# Patient Record
Sex: MALE | Race: ASIAN | NOT HISPANIC OR LATINO | URBAN - METROPOLITAN AREA
[De-identification: names, ages, dates, MRNs, and addresses within clinical notes are randomized per-mention and may not be internally consistent; named-entity substitution may affect disease eponyms.]

---

## 2017-12-15 ENCOUNTER — OUTPATIENT (OUTPATIENT)
Dept: EMERGENCY DEPT | Facility: HOSPITAL | Age: 67
LOS: 1 days | Discharge: HOME | End: 2017-12-15

## 2017-12-15 ENCOUNTER — INPATIENT (INPATIENT)
Facility: HOSPITAL | Age: 67
LOS: 5 days | Discharge: HOME | DRG: 236 | End: 2017-12-21
Attending: THORACIC SURGERY (CARDIOTHORACIC VASCULAR SURGERY) | Admitting: THORACIC SURGERY (CARDIOTHORACIC VASCULAR SURGERY)
Payer: COMMERCIAL

## 2017-12-15 VITALS
SYSTOLIC BLOOD PRESSURE: 135 MMHG | OXYGEN SATURATION: 100 % | HEART RATE: 80 BPM | TEMPERATURE: 98 F | DIASTOLIC BLOOD PRESSURE: 107 MMHG | RESPIRATION RATE: 18 BRPM

## 2017-12-15 VITALS
TEMPERATURE: 97 F | HEART RATE: 66 BPM | RESPIRATION RATE: 39 BRPM | OXYGEN SATURATION: 100 % | WEIGHT: 197.98 LBS | HEIGHT: 66 IN | DIASTOLIC BLOOD PRESSURE: 93 MMHG | SYSTOLIC BLOOD PRESSURE: 167 MMHG

## 2017-12-15 DIAGNOSIS — I25.10 ATHEROSCLEROTIC HEART DISEASE OF NATIVE CORONARY ARTERY WITHOUT ANGINA PECTORIS: ICD-10-CM

## 2017-12-15 DIAGNOSIS — R07.9 CHEST PAIN, UNSPECIFIED: ICD-10-CM

## 2017-12-15 DIAGNOSIS — Z98.890 OTHER SPECIFIED POSTPROCEDURAL STATES: Chronic | ICD-10-CM

## 2017-12-15 LAB
ALBUMIN SERPL ELPH-MCNC: 4.1 G/DL — SIGNIFICANT CHANGE UP (ref 3.3–5)
ALP SERPL-CCNC: 53 U/L — SIGNIFICANT CHANGE UP (ref 40–120)
ALT FLD-CCNC: 26 U/L — SIGNIFICANT CHANGE UP (ref 4–41)
APTT BLD: 29.1 SEC — SIGNIFICANT CHANGE UP (ref 27.5–37.4)
APTT BLD: 45.4 SEC — HIGH (ref 27.5–37.4)
AST SERPL-CCNC: 23 U/L — SIGNIFICANT CHANGE UP (ref 4–40)
BASE EXCESS BLDV CALC-SCNC: 1.1 MMOL/L — SIGNIFICANT CHANGE UP
BASOPHILS # BLD AUTO: 0.05 K/UL — SIGNIFICANT CHANGE UP (ref 0–0.2)
BASOPHILS NFR BLD AUTO: 0.5 % — SIGNIFICANT CHANGE UP (ref 0–2)
BILIRUB SERPL-MCNC: 0.4 MG/DL — SIGNIFICANT CHANGE UP (ref 0.2–1.2)
BLOOD GAS VENOUS - CREATININE: 0.69 MG/DL — SIGNIFICANT CHANGE UP (ref 0.5–1.3)
BUN SERPL-MCNC: 14 MG/DL — SIGNIFICANT CHANGE UP (ref 7–23)
CALCIUM SERPL-MCNC: 9.1 MG/DL — SIGNIFICANT CHANGE UP (ref 8.4–10.5)
CHLORIDE BLDV-SCNC: 103 MMOL/L — SIGNIFICANT CHANGE UP (ref 96–108)
CHLORIDE SERPL-SCNC: 98 MMOL/L — SIGNIFICANT CHANGE UP (ref 98–107)
CK MB BLD-MCNC: 3.17 NG/ML — SIGNIFICANT CHANGE UP (ref 1–6.6)
CK SERPL-CCNC: 161 U/L — SIGNIFICANT CHANGE UP (ref 30–200)
CO2 SERPL-SCNC: 23 MMOL/L — SIGNIFICANT CHANGE UP (ref 22–31)
CREAT SERPL-MCNC: 0.89 MG/DL — SIGNIFICANT CHANGE UP (ref 0.5–1.3)
EOSINOPHIL # BLD AUTO: 0.14 K/UL — SIGNIFICANT CHANGE UP (ref 0–0.5)
EOSINOPHIL NFR BLD AUTO: 1.4 % — SIGNIFICANT CHANGE UP (ref 0–6)
GAS PNL BLDA: SIGNIFICANT CHANGE UP
GAS PNL BLDV: 134 MMOL/L — LOW (ref 136–146)
GLUCOSE BLDV-MCNC: 178 — HIGH (ref 70–99)
GLUCOSE SERPL-MCNC: 167 MG/DL — HIGH (ref 70–99)
HCO3 BLDV-SCNC: 26 MMOL/L — SIGNIFICANT CHANGE UP (ref 20–27)
HCT VFR BLD CALC: 44.3 % — SIGNIFICANT CHANGE UP (ref 39–50)
HCT VFR BLDV CALC: 47.1 % — SIGNIFICANT CHANGE UP (ref 39–51)
HGB BLD-MCNC: 14.9 G/DL — SIGNIFICANT CHANGE UP (ref 13–17)
HGB BLDV-MCNC: 15.4 G/DL — SIGNIFICANT CHANGE UP (ref 13–17)
IMM GRANULOCYTES # BLD AUTO: 0.03 # — SIGNIFICANT CHANGE UP
IMM GRANULOCYTES NFR BLD AUTO: 0.3 % — SIGNIFICANT CHANGE UP (ref 0–1.5)
INR BLD: 1.01 — SIGNIFICANT CHANGE UP (ref 0.88–1.17)
INR BLD: 1.04 RATIO — SIGNIFICANT CHANGE UP (ref 0.88–1.16)
LACTATE BLDV-MCNC: 1.7 MMOL/L — SIGNIFICANT CHANGE UP (ref 0.5–2)
LYMPHOCYTES # BLD AUTO: 3.32 K/UL — HIGH (ref 1–3.3)
LYMPHOCYTES # BLD AUTO: 32 % — SIGNIFICANT CHANGE UP (ref 13–44)
MCHC RBC-ENTMCNC: 28.9 PG — SIGNIFICANT CHANGE UP (ref 27–34)
MCHC RBC-ENTMCNC: 33.6 % — SIGNIFICANT CHANGE UP (ref 32–36)
MCV RBC AUTO: 86 FL — SIGNIFICANT CHANGE UP (ref 80–100)
MONOCYTES # BLD AUTO: 0.53 K/UL — SIGNIFICANT CHANGE UP (ref 0–0.9)
MONOCYTES NFR BLD AUTO: 5.1 % — SIGNIFICANT CHANGE UP (ref 2–14)
NEUTROPHILS # BLD AUTO: 6.3 K/UL — SIGNIFICANT CHANGE UP (ref 1.8–7.4)
NEUTROPHILS NFR BLD AUTO: 60.7 % — SIGNIFICANT CHANGE UP (ref 43–77)
NRBC # FLD: 0 — SIGNIFICANT CHANGE UP
PA ADP PRP-ACNC: 197 PRU — SIGNIFICANT CHANGE UP (ref 194–417)
PCO2 BLDV: 37 MMHG — LOW (ref 41–51)
PH BLDV: 7.44 PH — HIGH (ref 7.32–7.43)
PLATELET # BLD AUTO: 292 K/UL — SIGNIFICANT CHANGE UP (ref 150–400)
PMV BLD: 9.8 FL — SIGNIFICANT CHANGE UP (ref 7–13)
PO2 BLDV: 81 MMHG — HIGH (ref 35–40)
POTASSIUM BLDV-SCNC: 4 MMOL/L — SIGNIFICANT CHANGE UP (ref 3.4–4.5)
POTASSIUM SERPL-MCNC: 4.3 MMOL/L — SIGNIFICANT CHANGE UP (ref 3.5–5.3)
POTASSIUM SERPL-SCNC: 4.3 MMOL/L — SIGNIFICANT CHANGE UP (ref 3.5–5.3)
PROT SERPL-MCNC: 7.3 G/DL — SIGNIFICANT CHANGE UP (ref 6–8.3)
PROTHROM AB SERPL-ACNC: 11.2 SEC — SIGNIFICANT CHANGE UP (ref 9.8–12.7)
PROTHROM AB SERPL-ACNC: 11.2 SEC — SIGNIFICANT CHANGE UP (ref 9.8–13.1)
RBC # BLD: 5.15 M/UL — SIGNIFICANT CHANGE UP (ref 4.2–5.8)
RBC # FLD: 13.2 % — SIGNIFICANT CHANGE UP (ref 10.3–14.5)
SAO2 % BLDV: 94.9 % — HIGH (ref 60–85)
SODIUM SERPL-SCNC: 138 MMOL/L — SIGNIFICANT CHANGE UP (ref 135–145)
TROPONIN T SERPL-MCNC: 0.06 NG/ML — SIGNIFICANT CHANGE UP (ref 0–0.06)
WBC # BLD: 10.37 K/UL — SIGNIFICANT CHANGE UP (ref 3.8–10.5)
WBC # FLD AUTO: 10.37 K/UL — SIGNIFICANT CHANGE UP (ref 3.8–10.5)

## 2017-12-15 PROCEDURE — 93010 ELECTROCARDIOGRAM REPORT: CPT

## 2017-12-15 PROCEDURE — 99222 1ST HOSP IP/OBS MODERATE 55: CPT

## 2017-12-15 RX ORDER — METOPROLOL TARTRATE 50 MG
5 TABLET ORAL ONCE
Qty: 0 | Refills: 0 | Status: COMPLETED | OUTPATIENT
Start: 2017-12-15 | End: 2017-12-15

## 2017-12-15 RX ORDER — PANTOPRAZOLE SODIUM 20 MG/1
40 TABLET, DELAYED RELEASE ORAL
Qty: 0 | Refills: 0 | Status: DISCONTINUED | OUTPATIENT
Start: 2017-12-15 | End: 2017-12-16

## 2017-12-15 RX ORDER — CHLORHEXIDINE GLUCONATE 213 G/1000ML
30 SOLUTION TOPICAL ONCE
Qty: 0 | Refills: 0 | Status: COMPLETED | OUTPATIENT
Start: 2017-12-16 | End: 2017-12-16

## 2017-12-15 RX ORDER — FENTANYL CITRATE 50 UG/ML
50 INJECTION INTRAVENOUS ONCE
Qty: 0 | Refills: 0 | Status: DISCONTINUED | OUTPATIENT
Start: 2017-12-15 | End: 2017-12-15

## 2017-12-15 RX ORDER — CHLORHEXIDINE GLUCONATE 213 G/1000ML
1 SOLUTION TOPICAL ONCE
Qty: 0 | Refills: 0 | Status: COMPLETED | OUTPATIENT
Start: 2017-12-16 | End: 2017-12-16

## 2017-12-15 RX ORDER — SODIUM CHLORIDE 9 MG/ML
3 INJECTION INTRAMUSCULAR; INTRAVENOUS; SUBCUTANEOUS EVERY 8 HOURS
Qty: 0 | Refills: 0 | Status: DISCONTINUED | OUTPATIENT
Start: 2017-12-15 | End: 2017-12-15

## 2017-12-15 RX ORDER — HEPARIN SODIUM 5000 [USP'U]/ML
1000 INJECTION INTRAVENOUS; SUBCUTANEOUS
Qty: 25000 | Refills: 0 | Status: DISCONTINUED | OUTPATIENT
Start: 2017-12-15 | End: 2017-12-16

## 2017-12-15 RX ORDER — CEFUROXIME AXETIL 250 MG
1500 TABLET ORAL ONCE
Qty: 0 | Refills: 0 | Status: DISCONTINUED | OUTPATIENT
Start: 2017-12-15 | End: 2017-12-16

## 2017-12-15 RX ORDER — ASPIRIN/CALCIUM CARB/MAGNESIUM 324 MG
325 TABLET ORAL ONCE
Qty: 0 | Refills: 0 | Status: COMPLETED | OUTPATIENT
Start: 2017-12-15 | End: 2017-12-15

## 2017-12-15 RX ADMIN — Medication 325 MILLIGRAM(S): at 18:16

## 2017-12-15 RX ADMIN — FENTANYL CITRATE 50 MICROGRAM(S): 50 INJECTION INTRAVENOUS at 23:00

## 2017-12-15 NOTE — H&P CARDIOLOGY - RS GEN PE MLT RESP DETAILS PC
good air movement/respirations non-labored/no chest wall tenderness/breath sounds equal/clear to auscultation bilaterally/airway patent

## 2017-12-15 NOTE — ED ADULT TRIAGE NOTE - CHIEF COMPLAINT QUOTE
Pt. c/o midsternal chest pain and RICHTER x 1 week, seen at Cardiologist this AM who performed a Stress test and told patient to come to ER for further evaluation and treatment. Pt. c/o chest pain on exertion. Pt. c/o midsternal chest pain and RICHTER x 1 week, seen at Cardiologist this AM who performed a Stress test and told patient to come to ER for further evaluation and treatment. Pt. c/o chest pain on exertion. [id number]

## 2017-12-15 NOTE — H&P ADULT - HISTORY OF PRESENT ILLNESS
67 year old male with PMH of HTN, HLD, DM, presenting to [facility] with complaints of exertional CP X 3 weeks. Pt states that he has been experiencing midsternal, non radiating CP when walking quickly. He saw his PCP on 12/15/17 who referred him to cardiologist. His baseline EKG reveals NSR with 1AVB with bifasicular block.  He underwent nuclear stress test revealing severe anterolateral wall ischemia. Was seen at [facility] where angiogram via radial approach demonstrated left main disease. Pt was subsequently transferred to [facility] CTU on 12/15/17 for CABG scheduled on 12/16/17. He appears well and denies any CP, SOB, palpitations.

## 2017-12-15 NOTE — CONSULT NOTE ADULT - SUBJECTIVE AND OBJECTIVE BOX
HISTORY OF PRESENT ILLNESS: 67 year old male HTN, HLD, DM, known to our office with complaints of exertional dyspnea and exertional CP. His baseline EKG reveals NSR with 1AVB with bifascicular block.  He underwent nuclear stress test in the office today revealing  severe anterior, septal, apical, and distal inferior wall ischemia.   He denies any  syncope , near syncope or palpitations, nausea, or diaphoresis    PAST MEDICAL & SURGICAL HISTORY:  DM (diabetes mellitus)  HLD (hyperlipidemia)  HTN (hypertension)  No significant past surgical history      [x ] Diabetes   [x ] Hypertension  [x ] Hyperlipidemia  [ ] CAD  [ ] PCI  [ ] CABG    PREVIOUS DIAGNOSTIC TESTING:    [ ] Echocardiogram: 12/11/17 in office       EF 73%       nl LVS fx       mild DD       Nl RV fx       Mild Biatrial enlargement  [ ]  Catheterization:  [ ] Stress Test:  	12/15/17 in office        Severe anterior, septal, apical, and distal inferior wall ischemia.        Normal left ventricular size and function. Calculated EF is 54%.       MEDICATIONS:    aspirin 325 milliGRAM(s) Oral once    Allergies    No Known Allergies    Intolerances        FAMILY HISTORY:  No pertinent family history in first degree relatives      SOCIAL HISTORY:    [x ] Non-smoker  [ ] Former Smoker  [ ] Current Smoker  [ ] Alcohol      REVIEW OF SYSTEMS:  [x ]chest pain  [x  ]shortness of breath / RICHTER   [  ]palpitations  [  ]syncope  [ ]near syncope [  ]diplopia  [  ]altered mental status   [  ]fevers  [ ]chills [ ]nausea  [ ] vomiting  [ ]abdominal pain  [ ]melena  [ ]BRBPR  [  ]epistaxis  [  ]rash  [  ]lower extremity edema          [x ] All others negative	  [ ] Unable to obtain    PHYSICAL EXAM:  T(C): 36.8 (12-15-17 @ 15:40), Max: 36.8 (12-15-17 @ 15:40)  HR: 80 (12-15-17 @ 15:40) (80 - 80)  BP: 135/107 (12-15-17 @ 15:40) (135/107 - 135/107)  RR: 18 (12-15-17 @ 15:40) (18 - 18)  SpO2: 100% (12-15-17 @ 15:40) (100% - 100%)  Wt(kg): --  I&O's Summary      Appearance: No Signs of acute distress  HEENT:   Normal oral mucosa, PERRL, EOMI	  Lymphatic: No lymphadenopathy  Cardiovascular: Normal S1 S2,RRR  No JVD, No murmurs,  Respiratory: Lungs clear to auscultation	  Gastrointestinal:  Soft, Non-tender, + BS	  Skin: No rashes, No ecchymoses, No cyanosis	  Extremities:  No clubbing, cyanosis or edema B/L LE's   Vascular: Peripheral pulses palpable 2+ bilaterally    TELEMETRY: 	    ECG:  NSR 1st degree AVB bifasicular block PVCs  	  RADIOLOGY:    OTHER: 	  	  LABS:	 	    CARDIAC MARKERS:    proBNP:   Lipid Profile:   HgA1c:   TSH:     ASSESSMENT/PLAN:  67 year old male HTN, HLD, DM, known to our office with complaints of exertional dyspnea and exertional CP. He does report previous episode of chest pain about 3 weeks ago.    His baseline EKG reveals NSR with 1AVB with bifascicular block.   He underwent nuclear stress test in the office today revealing severe anterior, septal, apical, and distal inferior wall ischemia.    -CE pending    -recent echo in office 12/11 w/  EF 73%  nl LVS fx  mild DD  Nl RV fx  Mild Biatrial enlargement	  - Stress test today in office as noted above   -received  in ED   -plan for CATH to eval CAD   - f/u with Dr. Ponce for cardiology 12/26 @12pm

## 2017-12-15 NOTE — DISCHARGE NOTE ADULT - PATIENT PORTAL LINK FT
“You can access the [employer] Patient Portal, offered by [facility], by registering with the following website: [email or web address]”

## 2017-12-15 NOTE — H&P ADULT - NSHPPHYSICALEXAM_GEN_ALL_CORE
General: Well nourished, well developed, no acute distress.                                                         Neuro: Normal exam oriented to person/place & time with no focal motor or sensory  deficits.                    Eyes: Normal exam of conjunctiva & lids, pupils equally reactive.   ENT: Normal exam of nasal/oral mucosa with absence of cyanosis.   Neck: Normal exam of jugular veins, trachea & thyroid.   Chest: Normal lung exam with good air movement absence of wheezes, rales, or rhonchi                                                                         CV:  Auscultation: normal [X ] S3[ ] S4[ ] Irregular [ ] Rub[ ] Clicks[ ]    Murmurs none:[X]systolic [ ]  diastolic [ ] holosystolic [ ]  Carotids: No Bruits[X ] Other:                  Abdominal Aorta: normal [ ] nonpalpable[X]                                                                         GI: Soft, non-tender, non-distended, liver & spleen with no noted masses or tenderness. Bowel sounds active in all 4 quadrants                                                                                             Extremities: Normal no evidence of cyanosis or deformity Edema: none[X ]trace[ ]1+[ ]2+[ ]3+[ ]4+[ ]  Lower Extremity Pulses: Right[  3 /4] Left[ 3 /4 ] Varicosities[  ]  SKIN :  Normal exam to inspection & palation.  No rashes, breakdown

## 2017-12-15 NOTE — H&P ADULT - NSHPREVIEWOFSYSTEMS_GEN_ALL_CORE
Review of Systems  GENERAL:  Fevers[] chills[] sweats[] fatigue[] weight loss[] weight gain []                                        NEURO:  parathesias[] seizures []  syncope []  confusion []                                                                                  EYES: glasses[]  blurry vision[]  discharge[] pain[] glaucoma [X]                                                                            ENMT:  difficulty hearing [X] in left ear  vertigo[]  dysphagia[] epistaxis[] recent dental work []                                      CV:  chest pain[] palpitations[] RICHTER [] diaphoresis [] edema[]                                                                                             RESPIRATORY:  wheezing[] SOB[] cough [] sputum[] hemoptysis[]                                                                    GI:  nausea[]  vomiting []  diarrhea[] constipation [] melena []                                                                        : hematuria[ ]  dysuria[ ] urgency[] incontinence[]                                                                                              MUSKULOSKELETAL:  arthritis[ ]  joint swelling [ ] muscle weakness [ ]                                                                  SKIN/BREAST:  rash[ ] itching [ ]  hair loss[ ] masses[ ]                                                                                                PSYCH:  dementia [ ] depresion [ ] anxiety[ ]                                                                                                                  HEME/LYMPH:  bruises easily[ ] enlarged lymph nodes[ ] tender lymph nodes[ ]                                                 ENDOCRINE:  cold intolerance[ ] heat intolerance[ ] polydipsia[ ]
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
## 2017-12-15 NOTE — DISCHARGE NOTE ADULT - CARE PROVIDER_API CALL
Sheron Christopher), Cardiovascular Disease; Internal Medicine; Interventional Cardiology  [address] [facility] [address] [place] [address]  Phone: [phone]  Fax: [phone]

## 2017-12-15 NOTE — ED ADULT NURSE NOTE - CHIEF COMPLAINT
The patient is a 67y Male from home, s/p abnormal stress test today, advised to go to ER, but went home and came back.  Pt denies cp, sob on resting.  Pmh of htn, dm.

## 2017-12-15 NOTE — DISCHARGE NOTE ADULT - FINDINGS/TREATMENT
oLM 90%, dLM 90%, oLAD 99%, LCx mild dz, RCA mild dz, RRA accessed oLM 90%, dLM 90%, oLAD 99%, LCx mild dz, RCA mild dz, LVEDP 15, RRA accessed

## 2017-12-15 NOTE — DISCHARGE NOTE ADULT - PLAN OF CARE
Coronary Revascularization Transfer to [facility] for CTS eval.  Start ACS Heparin gtt 6 hours post radial band removal w/ NO bolus

## 2017-12-15 NOTE — ED PROVIDER NOTE - OBJECTIVE STATEMENT
67M with HTN, DM sent to ED for (+) stress test [facility] Cardiologist at bedside, patient is planned for cath now. Patient had previous episode of CP 3 weeks ago, currently denies any CP, SOB, palpitations.

## 2017-12-15 NOTE — H&P CARDIOLOGY - NEGATIVE NEUROLOGICAL SYMPTOMS
no transient paralysis/no difficulty walking/no weakness/no confusion/no generalized seizures/no headache/no tremors/no vertigo/no facial palsy/no loss of sensation/no focal seizures/no syncope/no paresthesias/no loss of consciousness/no hemiparesis

## 2017-12-15 NOTE — H&P ADULT - PMH
DM (diabetes mellitus)    HLD (hyperlipidemia)    HTN (hypertension) DM (diabetes mellitus)    HLD (hyperlipidemia)    HTN (hypertension)    Left main coronary artery disease

## 2017-12-15 NOTE — ED ADULT NURSE NOTE - CHIEF COMPLAINT QUOTE
Pt. c/o midsternal chest pain and RICHTER x 1 week, seen at Cardiologist this AM who performed a Stress test and told patient to come to ER for further evaluation and treatment. Pt. c/o chest pain on exertion. [id number]

## 2017-12-15 NOTE — ED ADULT NURSE NOTE - OBJECTIVE STATEMENT
Abnormal EKG.  Not in visible distress.  Breathing unlabored.  Seen by MD Alfonso.  IV accessed.  Labs sent.  Seen by MD Salmeron STAT, brought pt straight to cath lab since pt is at high risk.  Report given to AZIZA Ching in Cathlab.  Wife at the bedside.

## 2017-12-15 NOTE — ED PROVIDER NOTE - ATTENDING CONTRIBUTION TO CARE
Aguilar: 67 yom with chest pain 3 weeks ago with negative workup, sent for stress test today.  Pt got a call that stress was positive in anterolateral ischemia.  On exam, pt is well appearing, no distress, clear lungs, normal cardiac, abd soft, no pitting edema, no jvd.  EKG with flat T waves in avl.  Pt's cardiologist at bedside pt to go to cath.

## 2017-12-15 NOTE — H&P CARDIOLOGY - ATTENDING COMMENTS
Patient seen and examined.  Agree with above.   -In brief, 68 yo M with history of DM admitted after NST showed anterior wall ischemia.   -Cath performed showing severe LV disease with good distal targets  -CTS consult with Dr. Cuellar  -Pt. hd stable, has no symptoms, LVEDP is 15, and EF is normal on TTE  -Pt. to be transferred to [facility] CTU for CABG eval today    Sheron Christopher MD  [facility]  [address] [place] [address], [id number]  [place] [address]  office: [phone]  pager: [phone]

## 2017-12-15 NOTE — H&P ADULT - ASSESSMENT
67 year old male with PMH of HTN, HLD, DM, presenting to [facility] on 12/15/17 with complaints of exertional CP X 3 weeks. Pt saw cardiologist who performed nuclear stress test revealing severe anterolateral wall ischemia. Angiogram via radial approach demonstrated left main disease. Pt admitted to [facility] CTU on 12/15/17 for CABG scheduled on 12/16/17.

## 2017-12-15 NOTE — H&P CARDIOLOGY - HISTORY OF PRESENT ILLNESS
67 year old male HTN, HLD, DM, known to our office with complaints of exertional dyspnea and exertional CP. His baseline EKG reveals NSR with 1AVB with bifasicular block.  He underwent nuclear stress test revealing severe anterolateral wall ischemia.  He denies any h/o syncope , near syncope or palpitations. Pt is admitted for cardiac cath.

## 2017-12-15 NOTE — DISCHARGE NOTE ADULT - HOSPITAL COURSE
67 year old male HTN, HLD, DM, known to our office with complaints of exertional dyspnea and exertional CP. His baseline EKG reveals NSR with 1AVB with bifasicular block.  He underwent nuclear stress test revealing severe anterolateral wall ischemia.  He denies any h/o syncope , near syncope or palpitations. Pt is admitted for cardiac cath.    Cardiac showed oLM 90%, dLM 90%, oLAD 99%, LCx mild dz, RCA mild dz    Pt will be transferred to [facility] for CTS evaluation w/ Dr. Cuellar

## 2017-12-15 NOTE — CONSULT NOTE ADULT - ATTENDING COMMENTS
patient seen/examined today  stress test with severe ischemia in anterior/lateral walls  left heart cath today
EP ATTENDING    Agree with above. EKG clearly demonstrates evidence of conduction system disease (first degree AV delay, RBBB, left axis deviation) consistent with bifascicular block. He denies presyncope nor syncope. Echo shows LVEF normal. F/U cath - unlikely to need EP study given no symptoms of heart block.

## 2017-12-15 NOTE — H&P ADULT - NSHPSOCIALHISTORY_GEN_ALL_CORE
SOCIAL HISTORY:  Smoker: [ ] Yes  [X ] No        PACK YEARS:          Quit date:  ETOH use: [ ] Yes  [X ] No              FREQUENCY / QUANTITY:  Ilicit Drug use:  [ ] Yes  [X ] No  Occupation: [place]   Living arrangements: Lives in a house with spouse, daughter, son in law and 3 grand children  Assist device use: None    Family HX:  Father alive at 92 with hx of CABG  Mother  with hx of DM

## 2017-12-15 NOTE — H&P ADULT - NSHPLABSRESULTS_GEN_ALL_CORE
14.9   10.37 )-----------( 292      ( 15 Dec 2017 16:10 )             44.3       138  |  98  |  14  ----------------------------<  167<H>  4.3   |  23  |  0.89      PT/INR - ( 15 Dec 2017 23:36 )   PT: 11.2 sec;   INR: 1.04 ratio         PTT - ( 15 Dec 2017 23:36 )  PTT:45.4 sec      ABG - ( 15 Dec 2017 23:23 )  pH: 7.46  /  pCO2: 33    /  pO2: 136   / HCO3: 23    / Base Excess: .5    /  SaO2: 98

## 2017-12-15 NOTE — DISCHARGE NOTE ADULT - CARE PLAN
Principal Discharge DX:	Left main coronary artery disease  Goal:	Coronary Revascularization  Instructions for follow-up, activity and diet:	Transfer to [facility] for CTS eval.  Start ACS Heparin gtt 6 hours post radial band removal w/ NO bolus

## 2017-12-15 NOTE — DISCHARGE NOTE ADULT - MEDICATION SUMMARY - MEDICATIONS TO TAKE
I will START or STAY ON the medications listed below when I get home from the hospital:    enalapril 10 mg oral tablet  -- 1 tab(s) by mouth once a day  -- Indication: For HTN (hypertension)    Jardiance 25 mg oral tablet  -- 1 tab(s) by mouth once a day (in the morning)  -- Indication: For DM (diabetes mellitus)    metFORMIN 1000 mg oral tablet  -- 1 tab(s) by mouth once a day  -- Indication: For DM (diabetes mellitus)    Tresiba FlexTouch 100 units/mL subcutaneous solution  -- 15 unit(s) subcutaneous once a day (at bedtime)  -- Indication: For DM (diabetes mellitus)

## 2017-12-16 DIAGNOSIS — I10 ESSENTIAL (PRIMARY) HYPERTENSION: ICD-10-CM

## 2017-12-16 DIAGNOSIS — Z01.818 ENCOUNTER FOR OTHER PREPROCEDURAL EXAMINATION: ICD-10-CM

## 2017-12-16 DIAGNOSIS — Z29.9 ENCOUNTER FOR PROPHYLACTIC MEASURES, UNSPECIFIED: ICD-10-CM

## 2017-12-16 DIAGNOSIS — E11.59 TYPE 2 DIABETES MELLITUS WITH OTHER CIRCULATORY COMPLICATIONS: ICD-10-CM

## 2017-12-16 DIAGNOSIS — I25.10 ATHEROSCLEROTIC HEART DISEASE OF NATIVE CORONARY ARTERY WITHOUT ANGINA PECTORIS: ICD-10-CM

## 2017-12-16 LAB
ALBUMIN SERPL ELPH-MCNC: 3.8 G/DL — SIGNIFICANT CHANGE UP (ref 3.3–5)
ALBUMIN SERPL ELPH-MCNC: 3.8 G/DL — SIGNIFICANT CHANGE UP (ref 3.3–5)
ALP SERPL-CCNC: 34 U/L — LOW (ref 40–120)
ALP SERPL-CCNC: 50 U/L — SIGNIFICANT CHANGE UP (ref 40–120)
ALT FLD-CCNC: 23 U/L RC — SIGNIFICANT CHANGE UP (ref 10–45)
ALT FLD-CCNC: 23 U/L RC — SIGNIFICANT CHANGE UP (ref 10–45)
ANION GAP SERPL CALC-SCNC: 12 MMOL/L — SIGNIFICANT CHANGE UP (ref 5–17)
ANION GAP SERPL CALC-SCNC: 16 MMOL/L — SIGNIFICANT CHANGE UP (ref 5–17)
APTT BLD: 100.8 SEC — HIGH (ref 27.5–37.4)
APTT BLD: 30.9 SEC — SIGNIFICANT CHANGE UP (ref 27.5–37.4)
AST SERPL-CCNC: 19 U/L — SIGNIFICANT CHANGE UP (ref 10–40)
AST SERPL-CCNC: 25 U/L — SIGNIFICANT CHANGE UP (ref 10–40)
BASOPHILS # BLD AUTO: 0.1 K/UL — SIGNIFICANT CHANGE UP (ref 0–0.2)
BASOPHILS NFR BLD AUTO: 0.9 % — SIGNIFICANT CHANGE UP (ref 0–2)
BILIRUB SERPL-MCNC: 0.3 MG/DL — SIGNIFICANT CHANGE UP (ref 0.2–1.2)
BILIRUB SERPL-MCNC: 1.2 MG/DL — SIGNIFICANT CHANGE UP (ref 0.2–1.2)
BLD GP AB SCN SERPL QL: NEGATIVE — SIGNIFICANT CHANGE UP
BUN SERPL-MCNC: 11 MG/DL — SIGNIFICANT CHANGE UP (ref 7–23)
BUN SERPL-MCNC: 13 MG/DL — SIGNIFICANT CHANGE UP (ref 7–23)
CALCIUM SERPL-MCNC: 8.4 MG/DL — SIGNIFICANT CHANGE UP (ref 8.4–10.5)
CALCIUM SERPL-MCNC: 9 MG/DL — SIGNIFICANT CHANGE UP (ref 8.4–10.5)
CHLORIDE SERPL-SCNC: 104 MMOL/L — SIGNIFICANT CHANGE UP (ref 96–108)
CHLORIDE SERPL-SCNC: 98 MMOL/L — SIGNIFICANT CHANGE UP (ref 96–108)
CHOLEST SERPL-MCNC: 190 MG/DL — SIGNIFICANT CHANGE UP (ref 10–199)
CK MB BLD-MCNC: 1.8 % — SIGNIFICANT CHANGE UP (ref 0–3.5)
CK MB BLD-MCNC: 6.8 % — HIGH (ref 0–3.5)
CK MB CFR SERPL CALC: 13 NG/ML — HIGH (ref 0–6.7)
CK MB CFR SERPL CALC: 2.4 NG/ML — SIGNIFICANT CHANGE UP (ref 0–6.7)
CK SERPL-CCNC: 134 U/L — SIGNIFICANT CHANGE UP (ref 30–200)
CK SERPL-CCNC: 190 U/L — SIGNIFICANT CHANGE UP (ref 30–200)
CO2 SERPL-SCNC: 22 MMOL/L — SIGNIFICANT CHANGE UP (ref 22–31)
CO2 SERPL-SCNC: 26 MMOL/L — SIGNIFICANT CHANGE UP (ref 22–31)
CREAT SERPL-MCNC: 0.78 MG/DL — SIGNIFICANT CHANGE UP (ref 0.5–1.3)
CREAT SERPL-MCNC: 0.79 MG/DL — SIGNIFICANT CHANGE UP (ref 0.5–1.3)
EOSINOPHIL # BLD AUTO: 0.2 K/UL — SIGNIFICANT CHANGE UP (ref 0–0.5)
EOSINOPHIL NFR BLD AUTO: 1.8 % — SIGNIFICANT CHANGE UP (ref 0–6)
FIBRINOGEN PPP-MCNC: 290 MG/DL — LOW (ref 310–510)
GAS PNL BLDA: SIGNIFICANT CHANGE UP
GLUCOSE BLDC GLUCOMTR-MCNC: 100 MG/DL — HIGH (ref 70–99)
GLUCOSE BLDC GLUCOMTR-MCNC: 114 MG/DL — HIGH (ref 70–99)
GLUCOSE BLDC GLUCOMTR-MCNC: 140 MG/DL — HIGH (ref 70–99)
GLUCOSE BLDC GLUCOMTR-MCNC: 95 MG/DL — SIGNIFICANT CHANGE UP (ref 70–99)
GLUCOSE BLDC GLUCOMTR-MCNC: 97 MG/DL — SIGNIFICANT CHANGE UP (ref 70–99)
GLUCOSE SERPL-MCNC: 114 MG/DL — HIGH (ref 70–99)
GLUCOSE SERPL-MCNC: 154 MG/DL — HIGH (ref 70–99)
HBA1C BLD-MCNC: 7.2 % — HIGH (ref 4–5.6)
HCT VFR BLD CALC: 35.4 % — LOW (ref 39–50)
HCT VFR BLD CALC: 35.6 % — LOW (ref 39–50)
HCT VFR BLD CALC: 42.3 % — SIGNIFICANT CHANGE UP (ref 39–50)
HDLC SERPL-MCNC: 36 MG/DL — LOW (ref 40–125)
HGB BLD-MCNC: 12 G/DL — LOW (ref 13–17)
HGB BLD-MCNC: 12.1 G/DL — LOW (ref 13–17)
HGB BLD-MCNC: 14.5 G/DL — SIGNIFICANT CHANGE UP (ref 13–17)
INR BLD: 1.17 RATIO — HIGH (ref 0.88–1.16)
LIPID PNL WITH DIRECT LDL SERPL: 107 MG/DL — SIGNIFICANT CHANGE UP
LYMPHOCYTES # BLD AUTO: 3.8 K/UL — HIGH (ref 1–3.3)
LYMPHOCYTES # BLD AUTO: 43.8 % — SIGNIFICANT CHANGE UP (ref 13–44)
MCHC RBC-ENTMCNC: 30.6 PG — SIGNIFICANT CHANGE UP (ref 27–34)
MCHC RBC-ENTMCNC: 30.8 PG — SIGNIFICANT CHANGE UP (ref 27–34)
MCHC RBC-ENTMCNC: 31.1 PG — SIGNIFICANT CHANGE UP (ref 27–34)
MCHC RBC-ENTMCNC: 33.8 GM/DL — SIGNIFICANT CHANGE UP (ref 32–36)
MCHC RBC-ENTMCNC: 34.2 GM/DL — SIGNIFICANT CHANGE UP (ref 32–36)
MCHC RBC-ENTMCNC: 34.2 GM/DL — SIGNIFICANT CHANGE UP (ref 32–36)
MCV RBC AUTO: 90.1 FL — SIGNIFICANT CHANGE UP (ref 80–100)
MCV RBC AUTO: 90.5 FL — SIGNIFICANT CHANGE UP (ref 80–100)
MCV RBC AUTO: 90.8 FL — SIGNIFICANT CHANGE UP (ref 80–100)
MONOCYTES # BLD AUTO: 0.6 K/UL — SIGNIFICANT CHANGE UP (ref 0–0.9)
MONOCYTES NFR BLD AUTO: 7.4 % — SIGNIFICANT CHANGE UP (ref 2–14)
NEUTROPHILS # BLD AUTO: 4 K/UL — SIGNIFICANT CHANGE UP (ref 1.8–7.4)
NEUTROPHILS NFR BLD AUTO: 46.1 % — SIGNIFICANT CHANGE UP (ref 43–77)
PLATELET # BLD AUTO: 200 K/UL — SIGNIFICANT CHANGE UP (ref 150–400)
PLATELET # BLD AUTO: 261 K/UL — SIGNIFICANT CHANGE UP (ref 150–400)
PLATELET # BLD AUTO: ABNORMAL (ref 150–400)
POTASSIUM SERPL-MCNC: 3.8 MMOL/L — SIGNIFICANT CHANGE UP (ref 3.5–5.3)
POTASSIUM SERPL-MCNC: 4.8 MMOL/L — SIGNIFICANT CHANGE UP (ref 3.5–5.3)
POTASSIUM SERPL-SCNC: 3.8 MMOL/L — SIGNIFICANT CHANGE UP (ref 3.5–5.3)
POTASSIUM SERPL-SCNC: 4.8 MMOL/L — SIGNIFICANT CHANGE UP (ref 3.5–5.3)
PROT SERPL-MCNC: 5.6 G/DL — LOW (ref 6–8.3)
PROT SERPL-MCNC: 6.9 G/DL — SIGNIFICANT CHANGE UP (ref 6–8.3)
PROTHROM AB SERPL-ACNC: 12.8 SEC — HIGH (ref 9.8–12.7)
RBC # BLD: 3.9 M/UL — LOW (ref 4.2–5.8)
RBC # BLD: 3.94 M/UL — LOW (ref 4.2–5.8)
RBC # BLD: 4.69 M/UL — SIGNIFICANT CHANGE UP (ref 4.2–5.8)
RBC # FLD: 12.2 % — SIGNIFICANT CHANGE UP (ref 10.3–14.5)
RBC # FLD: 12.3 % — SIGNIFICANT CHANGE UP (ref 10.3–14.5)
RBC # FLD: 12.4 % — SIGNIFICANT CHANGE UP (ref 10.3–14.5)
RH IG SCN BLD-IMP: POSITIVE — SIGNIFICANT CHANGE UP
RH IG SCN BLD-IMP: POSITIVE — SIGNIFICANT CHANGE UP
SODIUM SERPL-SCNC: 136 MMOL/L — SIGNIFICANT CHANGE UP (ref 135–145)
SODIUM SERPL-SCNC: 142 MMOL/L — SIGNIFICANT CHANGE UP (ref 135–145)
T3 SERPL-MCNC: 83 NG/DL — SIGNIFICANT CHANGE UP (ref 80–200)
T4 AB SER-ACNC: 5.7 UG/DL — SIGNIFICANT CHANGE UP (ref 4.6–12)
TOTAL CHOLESTEROL/HDL RATIO MEASUREMENT: 5.3 RATIO — SIGNIFICANT CHANGE UP (ref 3.4–9.6)
TRIGL SERPL-MCNC: 236 MG/DL — HIGH (ref 10–149)
TROPONIN T SERPL-MCNC: 0.08 NG/ML — HIGH (ref 0–0.06)
TROPONIN T SERPL-MCNC: 0.26 NG/ML — HIGH (ref 0–0.06)
TSH SERPL-MCNC: 3.15 UIU/ML — SIGNIFICANT CHANGE UP (ref 0.27–4.2)
WBC # BLD: 10.8 K/UL — HIGH (ref 3.8–10.5)
WBC # BLD: 8.6 K/UL — SIGNIFICANT CHANGE UP (ref 3.8–10.5)
WBC # BLD: 9.8 K/UL — SIGNIFICANT CHANGE UP (ref 3.8–10.5)
WBC # FLD AUTO: 10.8 K/UL — HIGH (ref 3.8–10.5)
WBC # FLD AUTO: 8.6 K/UL — SIGNIFICANT CHANGE UP (ref 3.8–10.5)
WBC # FLD AUTO: 9.8 K/UL — SIGNIFICANT CHANGE UP (ref 3.8–10.5)

## 2017-12-16 PROCEDURE — 93010 ELECTROCARDIOGRAM REPORT: CPT

## 2017-12-16 PROCEDURE — 33508 ENDOSCOPIC VEIN HARVEST: CPT

## 2017-12-16 PROCEDURE — 36620 INSERTION CATHETER ARTERY: CPT

## 2017-12-16 PROCEDURE — 33518 CABG ARTERY-VEIN TWO: CPT

## 2017-12-16 PROCEDURE — 33533 CABG ARTERIAL SINGLE: CPT

## 2017-12-16 PROCEDURE — 71010: CPT | Mod: 26

## 2017-12-16 RX ORDER — CEFUROXIME AXETIL 250 MG
1500 TABLET ORAL EVERY 8 HOURS
Qty: 0 | Refills: 0 | Status: COMPLETED | OUTPATIENT
Start: 2017-12-16 | End: 2017-12-18

## 2017-12-16 RX ORDER — POTASSIUM CHLORIDE 20 MEQ
10 PACKET (EA) ORAL ONCE
Qty: 0 | Refills: 0 | Status: COMPLETED | OUTPATIENT
Start: 2017-12-16 | End: 2017-12-16

## 2017-12-16 RX ORDER — DEXTROSE 50 % IN WATER 50 %
25 SYRINGE (ML) INTRAVENOUS ONCE
Qty: 0 | Refills: 0 | Status: DISCONTINUED | OUTPATIENT
Start: 2017-12-16 | End: 2017-12-21

## 2017-12-16 RX ORDER — OXYCODONE HYDROCHLORIDE 5 MG/1
5 TABLET ORAL EVERY 4 HOURS
Qty: 0 | Refills: 0 | Status: DISCONTINUED | OUTPATIENT
Start: 2017-12-17 | End: 2017-12-21

## 2017-12-16 RX ORDER — SODIUM CHLORIDE 9 MG/ML
1000 INJECTION, SOLUTION INTRAVENOUS
Qty: 0 | Refills: 0 | Status: DISCONTINUED | OUTPATIENT
Start: 2017-12-16 | End: 2017-12-21

## 2017-12-16 RX ORDER — POTASSIUM CHLORIDE 20 MEQ
10 PACKET (EA) ORAL ONCE
Qty: 0 | Refills: 0 | Status: DISCONTINUED | OUTPATIENT
Start: 2017-12-16 | End: 2017-12-17

## 2017-12-16 RX ORDER — POTASSIUM CHLORIDE 20 MEQ
10 PACKET (EA) ORAL
Qty: 0 | Refills: 0 | Status: COMPLETED | OUTPATIENT
Start: 2017-12-16 | End: 2017-12-16

## 2017-12-16 RX ORDER — MEPERIDINE HYDROCHLORIDE 50 MG/ML
25 INJECTION INTRAMUSCULAR; INTRAVENOUS; SUBCUTANEOUS ONCE
Qty: 0 | Refills: 0 | Status: DISCONTINUED | OUTPATIENT
Start: 2017-12-16 | End: 2017-12-16

## 2017-12-16 RX ORDER — HYDRALAZINE HCL 50 MG
10 TABLET ORAL ONCE
Qty: 0 | Refills: 0 | Status: COMPLETED | OUTPATIENT
Start: 2017-12-16 | End: 2017-12-16

## 2017-12-16 RX ORDER — ATORVASTATIN CALCIUM 80 MG/1
40 TABLET, FILM COATED ORAL AT BEDTIME
Qty: 0 | Refills: 0 | Status: DISCONTINUED | OUTPATIENT
Start: 2017-12-16 | End: 2017-12-17

## 2017-12-16 RX ORDER — ASPIRIN/CALCIUM CARB/MAGNESIUM 324 MG
325 TABLET ORAL DAILY
Qty: 0 | Refills: 0 | Status: DISCONTINUED | OUTPATIENT
Start: 2017-12-17 | End: 2017-12-21

## 2017-12-16 RX ORDER — FENTANYL CITRATE 50 UG/ML
50 INJECTION INTRAVENOUS ONCE
Qty: 0 | Refills: 0 | Status: DISCONTINUED | OUTPATIENT
Start: 2017-12-16 | End: 2017-12-16

## 2017-12-16 RX ORDER — POTASSIUM CHLORIDE 20 MEQ
10 PACKET (EA) ORAL
Qty: 0 | Refills: 0 | Status: DISCONTINUED | OUTPATIENT
Start: 2017-12-16 | End: 2017-12-17

## 2017-12-16 RX ORDER — GLUCAGON INJECTION, SOLUTION 0.5 MG/.1ML
1 INJECTION, SOLUTION SUBCUTANEOUS ONCE
Qty: 0 | Refills: 0 | Status: DISCONTINUED | OUTPATIENT
Start: 2017-12-16 | End: 2017-12-21

## 2017-12-16 RX ORDER — SODIUM CHLORIDE 9 MG/ML
1000 INJECTION INTRAMUSCULAR; INTRAVENOUS; SUBCUTANEOUS
Qty: 0 | Refills: 0 | Status: DISCONTINUED | OUTPATIENT
Start: 2017-12-16 | End: 2017-12-21

## 2017-12-16 RX ORDER — POTASSIUM CHLORIDE 20 MEQ
40 PACKET (EA) ORAL ONCE
Qty: 0 | Refills: 0 | Status: COMPLETED | OUTPATIENT
Start: 2017-12-16 | End: 2017-12-16

## 2017-12-16 RX ORDER — ASPIRIN/CALCIUM CARB/MAGNESIUM 324 MG
300 TABLET ORAL ONCE
Qty: 0 | Refills: 0 | Status: COMPLETED | OUTPATIENT
Start: 2017-12-16 | End: 2017-12-16

## 2017-12-16 RX ORDER — POLYETHYLENE GLYCOL 3350 17 G/17G
17 POWDER, FOR SOLUTION ORAL DAILY
Qty: 0 | Refills: 0 | Status: DISCONTINUED | OUTPATIENT
Start: 2017-12-17 | End: 2017-12-21

## 2017-12-16 RX ORDER — METOPROLOL TARTRATE 50 MG
25 TABLET ORAL EVERY 12 HOURS
Qty: 0 | Refills: 0 | Status: DISCONTINUED | OUTPATIENT
Start: 2017-12-16 | End: 2017-12-17

## 2017-12-16 RX ORDER — HEPARIN SODIUM 5000 [USP'U]/ML
1300 INJECTION INTRAVENOUS; SUBCUTANEOUS
Qty: 25000 | Refills: 0 | Status: DISCONTINUED | OUTPATIENT
Start: 2017-12-16 | End: 2017-12-16

## 2017-12-16 RX ORDER — PANTOPRAZOLE SODIUM 20 MG/1
40 TABLET, DELAYED RELEASE ORAL DAILY
Qty: 0 | Refills: 0 | Status: DISCONTINUED | OUTPATIENT
Start: 2017-12-16 | End: 2017-12-17

## 2017-12-16 RX ORDER — DEXTROSE 50 % IN WATER 50 %
1 SYRINGE (ML) INTRAVENOUS ONCE
Qty: 0 | Refills: 0 | Status: DISCONTINUED | OUTPATIENT
Start: 2017-12-16 | End: 2017-12-21

## 2017-12-16 RX ORDER — SODIUM CHLORIDE 9 MG/ML
1000 INJECTION, SOLUTION INTRAVENOUS
Qty: 0 | Refills: 0 | Status: DISCONTINUED | OUTPATIENT
Start: 2017-12-16 | End: 2017-12-17

## 2017-12-16 RX ORDER — DEXTROSE 50 % IN WATER 50 %
12.5 SYRINGE (ML) INTRAVENOUS ONCE
Qty: 0 | Refills: 0 | Status: DISCONTINUED | OUTPATIENT
Start: 2017-12-16 | End: 2017-12-21

## 2017-12-16 RX ORDER — DOCUSATE SODIUM 100 MG
100 CAPSULE ORAL THREE TIMES A DAY
Qty: 0 | Refills: 0 | Status: DISCONTINUED | OUTPATIENT
Start: 2017-12-16 | End: 2017-12-21

## 2017-12-16 RX ORDER — INSULIN HUMAN 100 [IU]/ML
3 INJECTION, SOLUTION SUBCUTANEOUS
Qty: 100 | Refills: 0 | Status: DISCONTINUED | OUTPATIENT
Start: 2017-12-16 | End: 2017-12-19

## 2017-12-16 RX ORDER — ACETAMINOPHEN 500 MG
650 TABLET ORAL EVERY 6 HOURS
Qty: 0 | Refills: 0 | Status: DISCONTINUED | OUTPATIENT
Start: 2017-12-17 | End: 2017-12-21

## 2017-12-16 RX ORDER — METOCLOPRAMIDE HCL 10 MG
10 TABLET ORAL EVERY 8 HOURS
Qty: 0 | Refills: 0 | Status: COMPLETED | OUTPATIENT
Start: 2017-12-16 | End: 2017-12-17

## 2017-12-16 RX ADMIN — Medication 100 MILLIGRAM(S): at 21:31

## 2017-12-16 RX ADMIN — Medication 25 MILLIGRAM(S): at 20:09

## 2017-12-16 RX ADMIN — Medication 10 MILLIGRAM(S): at 20:08

## 2017-12-16 RX ADMIN — FENTANYL CITRATE 50 MICROGRAM(S): 50 INJECTION INTRAVENOUS at 04:20

## 2017-12-16 RX ADMIN — HEPARIN SODIUM 13 UNIT(S)/HR: 5000 INJECTION INTRAVENOUS; SUBCUTANEOUS at 03:09

## 2017-12-16 RX ADMIN — Medication 40 MILLIEQUIVALENT(S): at 03:07

## 2017-12-16 RX ADMIN — Medication 40 MILLIEQUIVALENT(S): at 03:57

## 2017-12-16 RX ADMIN — PANTOPRAZOLE SODIUM 40 MILLIGRAM(S): 20 TABLET, DELAYED RELEASE ORAL at 05:14

## 2017-12-16 RX ADMIN — Medication 100 MILLIEQUIVALENT(S): at 15:22

## 2017-12-16 RX ADMIN — Medication 10 MILLIGRAM(S): at 02:00

## 2017-12-16 RX ADMIN — CHLORHEXIDINE GLUCONATE 1 APPLICATION(S): 213 SOLUTION TOPICAL at 07:20

## 2017-12-16 RX ADMIN — Medication 300 MILLIGRAM(S): at 15:33

## 2017-12-16 RX ADMIN — Medication 10 MILLIGRAM(S): at 15:14

## 2017-12-16 RX ADMIN — Medication 100 MILLIGRAM(S): at 16:07

## 2017-12-16 RX ADMIN — ATORVASTATIN CALCIUM 40 MILLIGRAM(S): 80 TABLET, FILM COATED ORAL at 21:31

## 2017-12-16 RX ADMIN — CHLORHEXIDINE GLUCONATE 30 MILLILITER(S): 213 SOLUTION TOPICAL at 05:14

## 2017-12-16 RX ADMIN — PANTOPRAZOLE SODIUM 40 MILLIGRAM(S): 20 TABLET, DELAYED RELEASE ORAL at 15:14

## 2017-12-16 NOTE — PROCEDURE NOTE - NSPROCDETAILS_GEN_ALL_CORE
location identified, draped/prepped, sterile technique used, needle inserted/introduced/all materials/supplies accounted for at end of procedure/positive blood return obtained via catheter
sutured in place/all materials/supplies accounted for at end of procedure/location identified, draped/prepped, sterile technique used, needle inserted/introduced/hemostasis with direct pressure, dressing applied/positive blood return obtained via catheter

## 2017-12-16 NOTE — PROGRESS NOTE ADULT - SUBJECTIVE AND OBJECTIVE BOX
Subjective:   	  MEDICATIONS:  MEDICATIONS  (STANDING):  aspirin enteric coated 325 milliGRAM(s) Oral daily  atorvastatin 40 milliGRAM(s) Oral at bedtime  cefuroxime  IVPB 1500 milliGRAM(s) IV Intermittent every 8 hours  dextrose 5%. 1000 milliLiter(s) (15 mL/Hr) IV Continuous <Continuous>  dextrose 5%. 1000 milliLiter(s) (50 mL/Hr) IV Continuous <Continuous>  dextrose 50% Injectable 12.5 Gram(s) IV Push once  dextrose 50% Injectable 25 Gram(s) IV Push once  dextrose 50% Injectable 25 Gram(s) IV Push once  docusate sodium 100 milliGRAM(s) Oral three times a day  insulin Infusion 3 Unit(s)/Hr (3 mL/Hr) IV Continuous <Continuous>  metoclopramide Injectable 10 milliGRAM(s) IV Push every 8 hours  metoprolol     tartrate 25 milliGRAM(s) Oral every 12 hours  pantoprazole  Injectable 40 milliGRAM(s) IV Push daily  potassium chloride  10 mEq/50 mL IVPB 10 milliEquivalent(s) IV Intermittent every 1 hour  potassium chloride  10 mEq/50 mL IVPB 10 milliEquivalent(s) IV Intermittent every 1 hour  potassium chloride  10 mEq/50 mL IVPB 10 milliEquivalent(s) IV Intermittent once  potassium chloride  10 mEq/50 mL IVPB 10 milliEquivalent(s) IV Intermittent every 1 hour  sodium chloride 0.9%. 1000 milliLiter(s) (10 mL/Hr) IV Continuous <Continuous>      LABS:	 	    CARDIAC MARKERS:  CARDIAC MARKERS ( 16 Dec 2017 12:37 )  x     / 0.26 ng/mL / 190 U/L / x     / 13.0 ng/mL  CARDIAC MARKERS ( 15 Dec 2017 23:36 )  x     / 0.08 ng/mL / 134 U/L / x     / 2.4 ng/mL  CARDIAC MARKERS ( 15 Dec 2017 16:10 )  x     / 0.06 ng/mL / 161 u/L / 3.17 ng/mL / x                                    12.1   10.8  )-----------( 200      ( 16 Dec 2017 18:47 )             35.4     Hemoglobin: 12.1 g/dL (12-16 @ 18:47)  Hemoglobin: 12.0 g/dL (12-16 @ 12:37)  Hemoglobin: 14.5 g/dL (12-15 @ 23:36)  Hemoglobin: 14.9 g/dL (12-15 @ 16:10)      12-16    142  |  104  |  11  ----------------------------<  114<H>  4.8   |  26  |  0.78    Ca    8.4      16 Dec 2017 12:37    TPro  5.6<L>  /  Alb  3.8  /  TBili  1.2  /  DBili  x   /  AST  25  /  ALT  23  /  AlkPhos  34<L>  12-16    Creatinine Trend: 0.78<--, 0.79<--, 0.89<--    COAGS:   PT/INR - ( 16 Dec 2017 12:40 )   PT: 12.8 sec;   INR: 1.17 ratio         PTT - ( 16 Dec 2017 12:40 )  PTT:30.9 sec    proBNP:   Lipid Profile:   HgA1c: Hemoglobin A1C, Whole Blood: 7.2 % (12-16 @ 02:23)    TSH: Thyroid Stimulating Hormone, Serum: 3.15 uIU/mL (12-16 @ 02:23)        PHYSICAL EXAM:  T(C): 37.4 (12-16-17 @ 20:00), Max: 37.4 (12-16-17 @ 20:00)  HR: 97 (12-16-17 @ 23:00) (64 - 98)  BP: --  RR: 24 (12-16-17 @ 23:00) (16 - 42)  SpO2: 96% (12-16-17 @ 23:00) (96% - 100%)  Wt(kg): --  I&O's Summary    15 Dec 2017 07:01  -  16 Dec 2017 07:00  --------------------------------------------------------  IN: 141 mL / OUT: 1900 mL / NET: -1759 mL    16 Dec 2017 07:01  -  16 Dec 2017 23:36  --------------------------------------------------------  IN: 733 mL / OUT: 1585 mL / NET: -852 mL          HEENT:   Normal oral mucosa, PERRL, EOMI	  Lymphatic: No obvious lymphadenopathy , no edema  Cardiovascular: Normal S1 S2, No JVD, 1/6 ADRIANA murmur, Peripheral pulses palpable 2+ bilaterally  Respiratory: Lungs clear to auscultation, normal effort 	  Gastrointestinal:  Soft, Non-tender, + BS	  Skin: No rashes,  No cyanosis, warm to touch  Musculoskeletal: Normal range of motion, normal strength  Psychiatry:  Appropriate Mood & affect     TELEMETRY: 	    ECG:  	  RADIOLOGY:     DIAGNOSTIC TESTING:  [ ] Echocardiogram:  [ ]  Catheterization:  [ ] Stress Test:    OTHER: 	      ASSESSMENT/PLAN: 	67y Male

## 2017-12-16 NOTE — PROGRESS NOTE ADULT - SUBJECTIVE AND OBJECTIVE BOX
Subjective:  pt seen and examined, no complaints on exam.  pt s/p C2L , POD 0  extubated , no gtts    aspirin enteric coated 325 milliGRAM(s) Oral daily  atorvastatin 40 milliGRAM(s) Oral at bedtime  cefuroxime  IVPB 1500 milliGRAM(s) IV Intermittent once  cefuroxime  IVPB 1500 milliGRAM(s) IV Intermittent every 8 hours  dextrose 5%. 1000 milliLiter(s) IV Continuous <Continuous>  dextrose 5%. 1000 milliLiter(s) IV Continuous <Continuous>  dextrose 50% Injectable 12.5 Gram(s) IV Push once  dextrose 50% Injectable 25 Gram(s) IV Push once  dextrose 50% Injectable 25 Gram(s) IV Push once  dextrose Gel 1 Dose(s) Oral once PRN  docusate sodium 100 milliGRAM(s) Oral three times a day  glucagon  Injectable 1 milliGRAM(s) IntraMuscular once PRN  insulin Infusion 3 Unit(s)/Hr IV Continuous <Continuous>  metoclopramide Injectable 10 milliGRAM(s) IV Push every 8 hours  pantoprazole  Injectable 40 milliGRAM(s) IV Push daily  potassium chloride  10 mEq/50 mL IVPB 10 milliEquivalent(s) IV Intermittent every 1 hour  potassium chloride  10 mEq/50 mL IVPB 10 milliEquivalent(s) IV Intermittent every 1 hour  potassium chloride  10 mEq/50 mL IVPB 10 milliEquivalent(s) IV Intermittent once  sodium chloride 0.9%. 1000 milliLiter(s) IV Continuous <Continuous>                            12.0   9.8   )-----------( CLUMPED    ( 16 Dec 2017 12:37 )             35.6       Hemoglobin: 12.0 g/dL (12-16 @ 12:37)  Hemoglobin: 14.5 g/dL (12-15 @ 23:36)  Hemoglobin: 14.9 g/dL (12-15 @ 16:10)      12-16    142  |  104  |  11  ----------------------------<  114<H>  4.8   |  26  |  0.78    Ca    8.4      16 Dec 2017 12:37    TPro  5.6<L>  /  Alb  3.8  /  TBili  1.2  /  DBili  x   /  AST  25  /  ALT  23  /  AlkPhos  34<L>  12-16    Creatinine Trend: 0.78<--, 0.79<--, 0.89<--    COAGS: PT/INR - ( 16 Dec 2017 12:40 )   PT: 12.8 sec;   INR: 1.17 ratio         PTT - ( 16 Dec 2017 12:40 )  PTT:30.9 sec    CARDIAC MARKERS ( 16 Dec 2017 12:37 )  x     / 0.26 ng/mL / 190 U/L / x     / 13.0 ng/mL  CARDIAC MARKERS ( 15 Dec 2017 23:36 )  x     / 0.08 ng/mL / 134 U/L / x     / 2.4 ng/mL  CARDIAC MARKERS ( 15 Dec 2017 16:10 )  x     / 0.06 ng/mL / 161 u/L / 3.17 ng/mL / x            T(C): 36.7 (12-16-17 @ 16:00), Max: 36.7 (12-16-17 @ 16:00)  HR: 95 (12-16-17 @ 17:30) (64 - 97)  BP: 132/74 (12-15-17 @ 22:00) (132/74 - 176/84)  RR: 16 (12-16-17 @ 14:00) (16 - 42)  SpO2: 96% (12-16-17 @ 17:30) (96% - 100%)  Wt(kg): --    I&O's Summary    15 Dec 2017 07:01  -  16 Dec 2017 07:00  --------------------------------------------------------  IN: 141 mL / OUT: 1900 mL / NET: -1759 mL    16 Dec 2017 07:01  -  16 Dec 2017 18:30  --------------------------------------------------------  IN: 481 mL / OUT: 995 mL / NET: -514 mL      HEENT:   Normal oral mucosa, PERRL, EOMI	  Lymphatic: No lymphadenopathy , no edema  Cardiovascular: Normal S1 S2, No JVD, No murmurs , Peripheral pulses palpable 2+ bilaterally  Respiratory: Lungs clear to auscultation, normal effort 	  Gastrointestinal:  Soft, Non-tender, + BS	  e    TELEMETRY: 	nsr      DIAGNOSTIC TESTING:  [ ] Echocardiogram:  intra op - KAE- NL lv fx  [ ]  Catheterization: diffuse multi vessel disease   OTHER: 	        ASSESSMENT/PLAN: 	67y Male hx of dm, htn, chol, obesity , now s/p C 2L  ef nl, POD 1    cont aggressive chest pt   Start BB when BP tolerates  Asa , statin   pain mangement    GI / DVT prophylaxis.   keep K>4, mag >2.0   post op ABX per cts  D/W Dr Scruggs

## 2017-12-16 NOTE — BRIEF OPERATIVE NOTE - PROCEDURE
<<-----Click on this checkbox to enter Procedure CABG x 2  12/16/2017  LIMA to LAD, SVG to OM  Active  [id number]

## 2017-12-16 NOTE — PROCEDURE NOTE - NSPOSTCAREGUIDE_GEN_A_CORE
Keep the cast/splint/dressing clean and dry/Verbal/written post procedure instructions were given to patient/caregiver
Verbal/written post procedure instructions were given to patient/caregiver/Keep the cast/splint/dressing clean and dry

## 2017-12-17 DIAGNOSIS — E78.5 HYPERLIPIDEMIA, UNSPECIFIED: ICD-10-CM

## 2017-12-17 LAB
ALBUMIN SERPL ELPH-MCNC: 3.9 G/DL — SIGNIFICANT CHANGE UP (ref 3.3–5)
ALP SERPL-CCNC: 33 U/L — LOW (ref 40–120)
ALT FLD-CCNC: 21 U/L RC — SIGNIFICANT CHANGE UP (ref 10–45)
ANION GAP SERPL CALC-SCNC: 15 MMOL/L — SIGNIFICANT CHANGE UP (ref 5–17)
AST SERPL-CCNC: 26 U/L — SIGNIFICANT CHANGE UP (ref 10–40)
BILIRUB SERPL-MCNC: 0.6 MG/DL — SIGNIFICANT CHANGE UP (ref 0.2–1.2)
BUN SERPL-MCNC: 15 MG/DL — SIGNIFICANT CHANGE UP (ref 7–23)
CALCIUM SERPL-MCNC: 8.6 MG/DL — SIGNIFICANT CHANGE UP (ref 8.4–10.5)
CHLORIDE SERPL-SCNC: 104 MMOL/L — SIGNIFICANT CHANGE UP (ref 96–108)
CO2 SERPL-SCNC: 19 MMOL/L — LOW (ref 22–31)
CREAT SERPL-MCNC: 0.75 MG/DL — SIGNIFICANT CHANGE UP (ref 0.5–1.3)
GAS PNL BLDA: SIGNIFICANT CHANGE UP
GLUCOSE BLDC GLUCOMTR-MCNC: 101 MG/DL — HIGH (ref 70–99)
GLUCOSE BLDC GLUCOMTR-MCNC: 107 MG/DL — HIGH (ref 70–99)
GLUCOSE BLDC GLUCOMTR-MCNC: 112 MG/DL — HIGH (ref 70–99)
GLUCOSE BLDC GLUCOMTR-MCNC: 116 MG/DL — HIGH (ref 70–99)
GLUCOSE BLDC GLUCOMTR-MCNC: 116 MG/DL — HIGH (ref 70–99)
GLUCOSE BLDC GLUCOMTR-MCNC: 117 MG/DL — HIGH (ref 70–99)
GLUCOSE BLDC GLUCOMTR-MCNC: 129 MG/DL — HIGH (ref 70–99)
GLUCOSE BLDC GLUCOMTR-MCNC: 134 MG/DL — HIGH (ref 70–99)
GLUCOSE BLDC GLUCOMTR-MCNC: 135 MG/DL — HIGH (ref 70–99)
GLUCOSE BLDC GLUCOMTR-MCNC: 137 MG/DL — HIGH (ref 70–99)
GLUCOSE BLDC GLUCOMTR-MCNC: 139 MG/DL — HIGH (ref 70–99)
GLUCOSE BLDC GLUCOMTR-MCNC: 148 MG/DL — HIGH (ref 70–99)
GLUCOSE BLDC GLUCOMTR-MCNC: 151 MG/DL — HIGH (ref 70–99)
GLUCOSE BLDC GLUCOMTR-MCNC: 166 MG/DL — HIGH (ref 70–99)
GLUCOSE BLDC GLUCOMTR-MCNC: 181 MG/DL — HIGH (ref 70–99)
GLUCOSE BLDC GLUCOMTR-MCNC: 193 MG/DL — HIGH (ref 70–99)
GLUCOSE BLDC GLUCOMTR-MCNC: 200 MG/DL — HIGH (ref 70–99)
GLUCOSE BLDC GLUCOMTR-MCNC: 212 MG/DL — HIGH (ref 70–99)
GLUCOSE BLDC GLUCOMTR-MCNC: 238 MG/DL — HIGH (ref 70–99)
GLUCOSE BLDC GLUCOMTR-MCNC: 88 MG/DL — SIGNIFICANT CHANGE UP (ref 70–99)
GLUCOSE SERPL-MCNC: 125 MG/DL — HIGH (ref 70–99)
HCT VFR BLD CALC: 34.2 % — LOW (ref 39–50)
HGB BLD-MCNC: 12.1 G/DL — LOW (ref 13–17)
MCHC RBC-ENTMCNC: 32.3 PG — SIGNIFICANT CHANGE UP (ref 27–34)
MCHC RBC-ENTMCNC: 35.3 GM/DL — SIGNIFICANT CHANGE UP (ref 32–36)
MCV RBC AUTO: 91.6 FL — SIGNIFICANT CHANGE UP (ref 80–100)
PLATELET # BLD AUTO: 194 K/UL — SIGNIFICANT CHANGE UP (ref 150–400)
POTASSIUM SERPL-MCNC: 4.9 MMOL/L — SIGNIFICANT CHANGE UP (ref 3.5–5.3)
POTASSIUM SERPL-SCNC: 4.9 MMOL/L — SIGNIFICANT CHANGE UP (ref 3.5–5.3)
PROT SERPL-MCNC: 6.1 G/DL — SIGNIFICANT CHANGE UP (ref 6–8.3)
RBC # BLD: 3.74 M/UL — LOW (ref 4.2–5.8)
RBC # FLD: 12.4 % — SIGNIFICANT CHANGE UP (ref 10.3–14.5)
SODIUM SERPL-SCNC: 138 MMOL/L — SIGNIFICANT CHANGE UP (ref 135–145)
WBC # BLD: 12.4 K/UL — HIGH (ref 3.8–10.5)
WBC # FLD AUTO: 12.4 K/UL — HIGH (ref 3.8–10.5)

## 2017-12-17 PROCEDURE — 71010: CPT | Mod: 26

## 2017-12-17 PROCEDURE — 93010 ELECTROCARDIOGRAM REPORT: CPT

## 2017-12-17 PROCEDURE — 99254 IP/OBS CNSLTJ NEW/EST MOD 60: CPT | Mod: GC

## 2017-12-17 RX ORDER — INSULIN LISPRO 100/ML
VIAL (ML) SUBCUTANEOUS
Qty: 0 | Refills: 0 | Status: DISCONTINUED | OUTPATIENT
Start: 2017-12-17 | End: 2017-12-17

## 2017-12-17 RX ORDER — METOPROLOL TARTRATE 50 MG
25 TABLET ORAL EVERY 8 HOURS
Qty: 0 | Refills: 0 | Status: DISCONTINUED | OUTPATIENT
Start: 2017-12-17 | End: 2017-12-18

## 2017-12-17 RX ORDER — INSULIN LISPRO 100/ML
4 VIAL (ML) SUBCUTANEOUS
Qty: 0 | Refills: 0 | Status: DISCONTINUED | OUTPATIENT
Start: 2017-12-17 | End: 2017-12-17

## 2017-12-17 RX ORDER — INSULIN GLARGINE 100 [IU]/ML
12 INJECTION, SOLUTION SUBCUTANEOUS AT BEDTIME
Qty: 0 | Refills: 0 | Status: DISCONTINUED | OUTPATIENT
Start: 2017-12-17 | End: 2017-12-17

## 2017-12-17 RX ORDER — INSULIN LISPRO 100/ML
VIAL (ML) SUBCUTANEOUS AT BEDTIME
Qty: 0 | Refills: 0 | Status: DISCONTINUED | OUTPATIENT
Start: 2017-12-17 | End: 2017-12-18

## 2017-12-17 RX ORDER — ATORVASTATIN CALCIUM 80 MG/1
80 TABLET, FILM COATED ORAL AT BEDTIME
Qty: 0 | Refills: 0 | Status: DISCONTINUED | OUTPATIENT
Start: 2017-12-17 | End: 2017-12-21

## 2017-12-17 RX ORDER — INSULIN LISPRO 100/ML
10 VIAL (ML) SUBCUTANEOUS
Qty: 0 | Refills: 0 | Status: DISCONTINUED | OUTPATIENT
Start: 2017-12-17 | End: 2017-12-17

## 2017-12-17 RX ORDER — INSULIN GLARGINE 100 [IU]/ML
34 INJECTION, SOLUTION SUBCUTANEOUS AT BEDTIME
Qty: 0 | Refills: 0 | Status: DISCONTINUED | OUTPATIENT
Start: 2017-12-17 | End: 2017-12-18

## 2017-12-17 RX ORDER — INSULIN LISPRO 100/ML
10 VIAL (ML) SUBCUTANEOUS
Qty: 0 | Refills: 0 | Status: DISCONTINUED | OUTPATIENT
Start: 2017-12-17 | End: 2017-12-20

## 2017-12-17 RX ORDER — ENOXAPARIN SODIUM 100 MG/ML
40 INJECTION SUBCUTANEOUS DAILY
Qty: 0 | Refills: 0 | Status: DISCONTINUED | OUTPATIENT
Start: 2017-12-17 | End: 2017-12-21

## 2017-12-17 RX ORDER — INSULIN LISPRO 100/ML
VIAL (ML) SUBCUTANEOUS
Qty: 0 | Refills: 0 | Status: DISCONTINUED | OUTPATIENT
Start: 2017-12-17 | End: 2017-12-18

## 2017-12-17 RX ADMIN — Medication 100 MILLIGRAM(S): at 08:22

## 2017-12-17 RX ADMIN — OXYCODONE HYDROCHLORIDE 5 MILLIGRAM(S): 5 TABLET ORAL at 14:34

## 2017-12-17 RX ADMIN — ATORVASTATIN CALCIUM 80 MILLIGRAM(S): 80 TABLET, FILM COATED ORAL at 21:08

## 2017-12-17 RX ADMIN — Medication 325 MILLIGRAM(S): at 12:21

## 2017-12-17 RX ADMIN — OXYCODONE HYDROCHLORIDE 5 MILLIGRAM(S): 5 TABLET ORAL at 05:25

## 2017-12-17 RX ADMIN — Medication 100 MILLIGRAM(S): at 16:06

## 2017-12-17 RX ADMIN — Medication 100 MILLIEQUIVALENT(S): at 00:05

## 2017-12-17 RX ADMIN — Medication 100 MILLIGRAM(S): at 05:02

## 2017-12-17 RX ADMIN — Medication 4 UNIT(S): at 12:21

## 2017-12-17 RX ADMIN — Medication 25 MILLIGRAM(S): at 13:10

## 2017-12-17 RX ADMIN — Medication 10 MILLIGRAM(S): at 05:39

## 2017-12-17 RX ADMIN — Medication 25 MILLIGRAM(S): at 05:02

## 2017-12-17 RX ADMIN — OXYCODONE HYDROCHLORIDE 5 MILLIGRAM(S): 5 TABLET ORAL at 15:04

## 2017-12-17 RX ADMIN — ENOXAPARIN SODIUM 40 MILLIGRAM(S): 100 INJECTION SUBCUTANEOUS at 12:21

## 2017-12-17 RX ADMIN — Medication 100 MILLIGRAM(S): at 00:07

## 2017-12-17 RX ADMIN — Medication 10 UNIT(S): at 18:32

## 2017-12-17 RX ADMIN — OXYCODONE HYDROCHLORIDE 5 MILLIGRAM(S): 5 TABLET ORAL at 05:55

## 2017-12-17 RX ADMIN — Medication 25 MILLIGRAM(S): at 21:08

## 2017-12-17 RX ADMIN — Medication 10 MILLIGRAM(S): at 13:09

## 2017-12-17 RX ADMIN — Medication 10 MILLIGRAM(S): at 21:08

## 2017-12-17 RX ADMIN — INSULIN GLARGINE 34 UNIT(S): 100 INJECTION, SOLUTION SUBCUTANEOUS at 22:15

## 2017-12-17 RX ADMIN — Medication 100 MILLIGRAM(S): at 21:08

## 2017-12-17 RX ADMIN — INSULIN HUMAN 3 UNIT(S)/HR: 100 INJECTION, SOLUTION SUBCUTANEOUS at 00:05

## 2017-12-17 NOTE — CONSULT NOTE ADULT - ASSESSMENT
66 yo male with PMH significant for type 2 diabetes (HbA1c 7.2%) c/b retinopathy, HTN, HLD, presents with chest pain. Patient underwent CABG on 12/16/17, today is POD #1. (high risk patient with high level decision making)

## 2017-12-17 NOTE — PROGRESS NOTE ADULT - SUBJECTIVE AND OBJECTIVE BOX
Patient discussed on morning rounds with       Operation / Date: CABG X 2 12/16/17    SUBJECTIVE ASSESSMENT:  67y Male resting in bed, no complaints.        Vital Signs Last 24 Hrs  T(C): 37.7 (17 Dec 2017 00:00), Max: 37.7 (17 Dec 2017 00:00)  T(F): 99.9 (17 Dec 2017 00:00), Max: 99.9 (17 Dec 2017 00:00)  HR: 96 (17 Dec 2017 00:00) (64 - 98)  BP: --  BP(mean): --  RR: 18 (17 Dec 2017 00:00) (16 - 41)  SpO2: 95% (17 Dec 2017 00:00) (95% - 100%)  I&O's Detail    15 Dec 2017 07:01  -  16 Dec 2017 07:00  --------------------------------------------------------  IN:    heparin  Infusion.: 50 mL    heparin  Infusion.: 13 mL    heparin Infusion: 78 mL  Total IN: 141 mL    OUT:    Voided: 1900 mL  Total OUT: 1900 mL    Total NET: -1759 mL      16 Dec 2017 07:01  -  17 Dec 2017 00:46  --------------------------------------------------------  IN:    insulin Infusion: 33 mL    IV PiggyBack: 550 mL    Oral Fluid: 90 mL    sodium chloride 0.9%.: 120 mL  Total IN: 793 mL    OUT:    Chest Tube: 5 mL    Chest Tube: 250 mL    Indwelling Catheter - Urethral: 1425 mL  Total OUT: 1680 mL    Total NET: -887 mL          CHEST TUBE:  Yes/No. AIR LEAKS: Yes/No. Suction / H2O SEAL.   ANDERSON DRAIN:  Yes/No.  EPICARDIAL WIRES: Yes/No.  TIE DOWNS: Yes/No.  HURST: Yes/No.    PHYSICAL EXAM:    General: Awake and in no acute distress    Neurological:  Alert and orientated times three.      Cardiovascular:  S1 S2 regular rate and rhythm    Respiratory:  nonlabored, clear to auscultation bilaterally    Gastrointestinal:  Soft, nontender with bowel sounds    Extremities:  No edema, pulses intact bilateral lower and upper extremities    Incision Sites:  Dry sterile dressing intact    LABS:                        12.1   10.8  )-----------( 200      ( 16 Dec 2017 18:47 )             35.4       PT/INR - ( 16 Dec 2017 12:40 )   PT: 12.8 sec;   INR: 1.17 ratio         PTT - ( 16 Dec 2017 12:40 )  PTT:30.9 sec    12-16    142  |  104  |  11  ----------------------------<  114<H>  4.8   |  26  |  0.78    Ca    8.4      16 Dec 2017 12:37    TPro  5.6<L>  /  Alb  3.8  /  TBili  1.2  /  DBili  x   /  AST  25  /  ALT  23  /  AlkPhos  34<L>  12-16            RADIOLOGY & ADDITIONAL TESTS:

## 2017-12-17 NOTE — PROGRESS NOTE ADULT - ASSESSMENT
68 yo male with PMH HLD, HTN, DM 2, s/p CABG X2 POD #1    1. beta blockers as tolerated for a-fib ppx  2. Aspirin, statin for graft patency ppx  3. GI ppx  4. VTE ppx  5. OOB to chair, ambulate as tolerated  6. Blood sugar control  7. Pain management

## 2017-12-17 NOTE — CONSULT NOTE ADULT - ATTENDING COMMENTS
Agree with plan by Dr. Angel.  Lantus dose specified above for this evening, then turn off insulin gtt 2 hours later.  Also add humalog for mealtime coverage this evening.  Goal BG <180 for good wound healing, ideally not low <100.

## 2017-12-17 NOTE — PROGRESS NOTE ADULT - SUBJECTIVE AND OBJECTIVE BOX
Patient denies CP, Breathing comfortabl POD #1    acetaminophen   Tablet. 650 milliGRAM(s) Oral every 6 hours PRN  aspirin enteric coated 325 milliGRAM(s) Oral daily  atorvastatin 80 milliGRAM(s) Oral at bedtime  cefuroxime  IVPB 1500 milliGRAM(s) IV Intermittent every 8 hours  dextrose 5%. 1000 milliLiter(s) IV Continuous <Continuous>  dextrose 50% Injectable 12.5 Gram(s) IV Push once  dextrose 50% Injectable 25 Gram(s) IV Push once  dextrose 50% Injectable 25 Gram(s) IV Push once  dextrose Gel 1 Dose(s) Oral once PRN  docusate sodium 100 milliGRAM(s) Oral three times a day  enoxaparin Injectable 40 milliGRAM(s) SubCutaneous daily  glucagon  Injectable 1 milliGRAM(s) IntraMuscular once PRN  insulin glargine Injectable (LANTUS) 12 Unit(s) SubCutaneous at bedtime  insulin Infusion 3 Unit(s)/Hr IV Continuous <Continuous>  insulin lispro (HumaLOG) corrective regimen sliding scale   SubCutaneous three times a day before meals  insulin lispro (HumaLOG) corrective regimen sliding scale   SubCutaneous at bedtime  insulin lispro Injectable (HumaLOG) 4 Unit(s) SubCutaneous three times a day before meals  metoclopramide Injectable 10 milliGRAM(s) IV Push every 8 hours  metoprolol     tartrate 25 milliGRAM(s) Oral every 8 hours  oxyCODONE    IR 5 milliGRAM(s) Oral every 4 hours PRN  polyethylene glycol 3350 17 Gram(s) Oral daily  sodium chloride 0.9%. 1000 milliLiter(s) IV Continuous <Continuous>                            12.1   12.4  )-----------( 194      ( 17 Dec 2017 01:50 )             34.2       Hemoglobin: 12.1 g/dL (12-17 @ 01:50)  Hemoglobin: 12.1 g/dL (12-16 @ 18:47)  Hemoglobin: 12.0 g/dL (12-16 @ 12:37)  Hemoglobin: 14.5 g/dL (12-15 @ 23:36)  Hemoglobin: 14.9 g/dL (12-15 @ 16:10)      12-17    138  |  104  |  15  ----------------------------<  125<H>  4.9   |  19<L>  |  0.75    Ca    8.6      17 Dec 2017 01:50    TPro  6.1  /  Alb  3.9  /  TBili  0.6  /  DBili  x   /  AST  26  /  ALT  21  /  AlkPhos  33<L>  12-17    Creatinine Trend: 0.75<--, 0.78<--, 0.79<--, 0.89<--    COAGS:     CARDIAC MARKERS ( 16 Dec 2017 12:37 )  x     / 0.26 ng/mL / 190 U/L / x     / 13.0 ng/mL  CARDIAC MARKERS ( 15 Dec 2017 23:36 )  x     / 0.08 ng/mL / 134 U/L / x     / 2.4 ng/mL  CARDIAC MARKERS ( 15 Dec 2017 16:10 )  x     / 0.06 ng/mL / 161 u/L / 3.17 ng/mL / x            T(C): 37.1 (12-17-17 @ 11:00), Max: 37.7 (12-17-17 @ 00:00)  HR: 87 (12-17-17 @ 12:00) (82 - 98)  BP: 139/65 (12-17-17 @ 12:00) (109/58 - 139/65)  RR: 29 (12-17-17 @ 12:00) (16 - 33)  SpO2: 99% (12-17-17 @ 12:00) (95% - 100%)  Wt(kg): --    I&O's Summary    16 Dec 2017 07:01  -  17 Dec 2017 07:00  --------------------------------------------------------  IN: 989 mL / OUT: 2170 mL / NET: -1181 mL    17 Dec 2017 07:01  -  17 Dec 2017 12:42  --------------------------------------------------------  IN: 344.5 mL / OUT: 90 mL / NET: 254.5 mL        HEENT:   Normal oral mucosa, PERRL, EOMI	  Lymphatic: No lymphadenopathy , no edema  Cardiovascular: Normal S1 S2, No JVD, No murmurs , Peripheral pulses palpable 2+ bilaterally  Respiratory: Lungs clear to auscultation, normal effort 	  Gastrointestinal:  Soft, Non-tender, + BS	  e    TELEMETRY: 	nsr      DIAGNOSTIC TESTING:  [ ] Echocardiogram:  intra op - KAE- NL lv fx  [ ]  Catheterization: diffuse multi vessel disease   OTHER: 	        ASSESSMENT/PLAN: 	67y Male hx of dm, htn, chol, obesity , now s/p C 2L  ef nl, POD 1    cont aggressive chest pt   Tolerating BB  Asa , statin   pain mangement    GI / DVT prophylaxis.   keep K>4, mag >2.0     Piotr Scruggs MD, FACC  [facility], [facility]  [address] [place] [address]  PHONE:  [phone]  BEEPER : [phone]

## 2017-12-17 NOTE — CONSULT NOTE ADULT - SUBJECTIVE AND OBJECTIVE BOX
HPI:  66 yo male with PMH significant for type 2 diabetes (HbA1c 7.2%) c/b retinopathy, HTN, HLD, presents with chest pain. Patient underwent CABG on 12/16/17, today is POD #1.    Endo consulted for diabetes management. Diagnosed with diabetes in 2004. Endocrinologist is Dr. Eboni Lowry. At home takes Tresiba 15u qHS (started 2 months ago), Jardiance 25mg daily (started 3 months ago) and Metformin 1g daily. Patient checks FS 2x per week in AM, usually between 110-120. No hypoglycemia episodes or symptoms.    Complications- last optho 3 months ago, has retinopathy in L eye s/p injections. No neuropathy sx or known proteinuria.   Diet- AM-cereal, coffee, cornflakes with Truvia. Lunch- brown rice, veggies. Dinner- veggies or protein. Vegeterian. Snacks on cashews or almonds. No juice or soda. Walks for exercise.  Diabetes in mother.    Patient is currently on an insulin drip and being transitioned to basal/bolus.    PAST MEDICAL & SURGICAL HISTORY:  Left main coronary artery disease  DM (diabetes mellitus)  HLD (hyperlipidemia)  HTN (hypertension)  S/P cardiac catheterization      FAMILY HISTORY:  Diabetes in mother    Social History: No smoking or EtOH    Outpatient Medications:  Jardiance 25 mg oral tablet: 1 tab(s) orally once a day (in the morning)  · 	metFORMIN 1000 mg oral tablet: 1 tab(s) orally once a day  · 	Tresiba FlexTouch 100 units/mL subcutaneous solution: 15 unit(s) subcutaneous once a day (at bedtime)  · 	enalapril 10 mg oral tablet: 1 tab(s) orally once a day    MEDICATIONS  (STANDING):  aspirin enteric coated 325 milliGRAM(s) Oral daily  atorvastatin 80 milliGRAM(s) Oral at bedtime  cefuroxime  IVPB 1500 milliGRAM(s) IV Intermittent every 8 hours  dextrose 5%. 1000 milliLiter(s) (50 mL/Hr) IV Continuous <Continuous>  dextrose 50% Injectable 12.5 Gram(s) IV Push once  dextrose 50% Injectable 25 Gram(s) IV Push once  dextrose 50% Injectable 25 Gram(s) IV Push once  docusate sodium 100 milliGRAM(s) Oral three times a day  enoxaparin Injectable 40 milliGRAM(s) SubCutaneous daily  insulin glargine Injectable (LANTUS) 12 Unit(s) SubCutaneous at bedtime  insulin Infusion 3 Unit(s)/Hr (3 mL/Hr) IV Continuous <Continuous>  insulin lispro (HumaLOG) corrective regimen sliding scale   SubCutaneous three times a day before meals  insulin lispro (HumaLOG) corrective regimen sliding scale   SubCutaneous at bedtime  insulin lispro Injectable (HumaLOG) 4 Unit(s) SubCutaneous three times a day before meals  metoclopramide Injectable 10 milliGRAM(s) IV Push every 8 hours  metoprolol     tartrate 25 milliGRAM(s) Oral every 8 hours  polyethylene glycol 3350 17 Gram(s) Oral daily  sodium chloride 0.9%. 1000 milliLiter(s) (10 mL/Hr) IV Continuous <Continuous>    MEDICATIONS  (PRN):  acetaminophen   Tablet. 650 milliGRAM(s) Oral every 6 hours PRN Mild Pain (1 - 3)  dextrose Gel 1 Dose(s) Oral once PRN Blood Glucose LESS THAN 70 milliGRAM(s)/deciLiter  glucagon  Injectable 1 milliGRAM(s) IntraMuscular once PRN Glucose <70 milliGRAM(s)/deciLiter  oxyCODONE    IR 5 milliGRAM(s) Oral every 4 hours PRN Moderate Pain (4 - 6)      Allergies    No Known Allergies    Review of Systems:  Constitutional: No fever  Eyes: No blurry vision  Neuro: No tremors  HEENT: No pain  Cardiovascular: No chest pain, palpitations  Respiratory: No SOB, no cough  GI: No nausea, vomiting, abdominal pain  : No dysuria  Skin: no rash    ALL OTHER SYSTEMS REVIEWED AND NEGATIVE    UNABLE TO OBTAIN    PHYSICAL EXAM:  VITALS: T(C): 37.1 (12-17-17 @ 11:00)  T(F): 98.7 (12-17-17 @ 11:00), Max: 99.9 (12-17-17 @ 00:00)  HR: 87 (12-17-17 @ 13:00) (82 - 98)  BP: 129/69 (12-17-17 @ 13:00) (109/58 - 139/65)  RR:  (16 - 33)  SpO2:  (95% - 100%)  Wt(kg): --  GENERAL: NAD  EYES: No proptosis, no lid lag, anicteric  HEENT:  Atraumatic, Normocephalic, moist mucous membranes  THYROID: Normal size, no palpable nodules  RESPIRATORY: Clear to auscultation bilaterally; No rales, rhonchi, wheezing, or rubs  CARDIOVASCULAR: Regular rate and rhythm; No murmurs; no peripheral edema  GI: Soft, nontender, non distended, normal bowel sounds  SKIN: Dry, intact, No rashes or lesions, + chest tube, + bandage on chest  NEURO: sensation intact, extraocular movements intact, no tremor, normal reflexes  PSYCH: Alert and oriented x 3    POCT Blood Glucose.: 166 mg/dL (12-17-17 @ 12:09)  POCT Blood Glucose.: 238 mg/dL (12-17-17 @ 11:12)  POCT Blood Glucose.: 212 mg/dL (12-17-17 @ 10:08)  POCT Blood Glucose.: 148 mg/dL (12-17-17 @ 06:52)  POCT Blood Glucose.: 135 mg/dL (12-17-17 @ 04:55)  POCT Blood Glucose.: 116 mg/dL (12-17-17 @ 04:02)  POCT Blood Glucose.: 116 mg/dL (12-17-17 @ 03:34)  POCT Blood Glucose.: 117 mg/dL (12-17-17 @ 01:54)  POCT Blood Glucose.: 101 mg/dL (12-17-17 @ 00:38)  POCT Blood Glucose.: 95 mg/dL (12-16-17 @ 23:56)  POCT Blood Glucose.: 114 mg/dL (12-16-17 @ 21:53)  POCT Blood Glucose.: 140 mg/dL (12-16-17 @ 21:01)  POCT Blood Glucose.: 97 mg/dL (12-16-17 @ 19:28)  POCT Blood Glucose.: 100 mg/dL (12-16-17 @ 18:35)  POCT Blood Glucose.: 171 mg/dL (12-15-17 @ 15:46)                            12.1   12.4  )-----------( 194      ( 17 Dec 2017 01:50 )             34.2       12-17    138  |  104  |  15  ----------------------------<  125<H>  4.9   |  19<L>  |  0.75    EGFR if : 110  EGFR if non : 95    Ca    8.6      12-17    TPro  6.1  /  Alb  3.9  /  TBili  0.6  /  DBili  x   /  AST  26  /  ALT  21  /  AlkPhos  33<L>  12-17      Thyroid Function Tests:  12-16 @ 02:23 TSH 3.15 FreeT4 -- T3 83 Anti TPO -- Anti Thyroglobulin Ab -- TSI --      Hemoglobin A1C, Whole Blood: 7.2 % <H> [4.0 - 5.6] (12-16-17 @ 02:23)      12-16 Chol 190  HDL 36<L> Trig 236<H> HPI:  66 yo male with PMH significant for type 2 diabetes (HbA1c 7.2%) c/b retinopathy, HTN, HLD, presents with chest pain. Patient underwent CABG on 12/16/17, today is POD #1.    Endo consulted for diabetes management. Diagnosed with diabetes in 2004. Endocrinologist is Dr. Eboni Lowry. At home takes Tresiba 15u qHS (started 2 months ago), Jardiance 25mg daily (started 3 months ago) and Metformin 1g daily. Patient checks FS 2x per week in AM, usually between 110-120. No hypoglycemia episodes or symptoms.    Complications- last optho 3 months ago, has retinopathy in L eye s/p injections. No neuropathy sx or known proteinuria.   Diet- AM-cereal, coffee, cornflakes with Truvia. Lunch- brown rice, veggies. Dinner- veggies or protein. Vegeterian. Snacks on cashews or almonds. No juice or soda. Walks for exercise.  Diabetes in mother.    Patient is currently on an insulin drip and being transitioned to basal/bolus.    PAST MEDICAL & SURGICAL HISTORY:  Left main coronary artery disease  DM (diabetes mellitus)  HLD (hyperlipidemia)  HTN (hypertension)  S/P cardiac catheterization      FAMILY HISTORY:  Diabetes in mother    Social History: No smoking or EtOH    Outpatient Medications:  Jardiance 25 mg oral tablet: 1 tab(s) orally once a day (in the morning)  · 	metFORMIN 1000 mg oral tablet: 1 tab(s) orally once a day  · 	Tresiba FlexTouch 100 units/mL subcutaneous solution: 15 unit(s) subcutaneous once a day (at bedtime)  · 	enalapril 10 mg oral tablet: 1 tab(s) orally once a day    MEDICATIONS  (STANDING):  aspirin enteric coated 325 milliGRAM(s) Oral daily  atorvastatin 80 milliGRAM(s) Oral at bedtime  cefuroxime  IVPB 1500 milliGRAM(s) IV Intermittent every 8 hours  dextrose 5%. 1000 milliLiter(s) (50 mL/Hr) IV Continuous <Continuous>  dextrose 50% Injectable 12.5 Gram(s) IV Push once  dextrose 50% Injectable 25 Gram(s) IV Push once  dextrose 50% Injectable 25 Gram(s) IV Push once  docusate sodium 100 milliGRAM(s) Oral three times a day  enoxaparin Injectable 40 milliGRAM(s) SubCutaneous daily  insulin glargine Injectable (LANTUS) 12 Unit(s) SubCutaneous at bedtime  insulin Infusion 3 Unit(s)/Hr (3 mL/Hr) IV Continuous <Continuous>  insulin lispro (HumaLOG) corrective regimen sliding scale   SubCutaneous three times a day before meals  insulin lispro (HumaLOG) corrective regimen sliding scale   SubCutaneous at bedtime  insulin lispro Injectable (HumaLOG) 4 Unit(s) SubCutaneous three times a day before meals  metoclopramide Injectable 10 milliGRAM(s) IV Push every 8 hours  metoprolol     tartrate 25 milliGRAM(s) Oral every 8 hours  polyethylene glycol 3350 17 Gram(s) Oral daily  sodium chloride 0.9%. 1000 milliLiter(s) (10 mL/Hr) IV Continuous <Continuous>    MEDICATIONS  (PRN):  acetaminophen   Tablet. 650 milliGRAM(s) Oral every 6 hours PRN Mild Pain (1 - 3)  dextrose Gel 1 Dose(s) Oral once PRN Blood Glucose LESS THAN 70 milliGRAM(s)/deciLiter  glucagon  Injectable 1 milliGRAM(s) IntraMuscular once PRN Glucose <70 milliGRAM(s)/deciLiter  oxyCODONE    IR 5 milliGRAM(s) Oral every 4 hours PRN Moderate Pain (4 - 6)      Allergies    No Known Allergies    Review of Systems:  Constitutional: No fever  Eyes: No blurry vision  Neuro: No tremors  HEENT: No pain  Cardiovascular: No chest pain, palpitations  Respiratory: No SOB, no cough  GI: No nausea, vomiting, abdominal pain  : No dysuria  Skin: no rash    ALL OTHER SYSTEMS REVIEWED AND NEGATIVE    UNABLE TO OBTAIN    PHYSICAL EXAM:  VITALS: T(C): 37.1 (12-17-17 @ 11:00)  T(F): 98.7 (12-17-17 @ 11:00), Max: 99.9 (12-17-17 @ 00:00)  HR: 87 (12-17-17 @ 13:00) (82 - 98)  BP: 129/69 (12-17-17 @ 13:00) (109/58 - 139/65)  RR:  (16 - 33)  SpO2:  (95% - 100%)  Wt(kg): --  GENERAL: NAD, interactive sitting in chair   EYES: No proptosis, no lid lag, anicteric  HEENT:  Atraumatic, Normocephalic, moist mucous membranes  RESPIRATORY: Clear to auscultation bilaterally; No rales, rhonchi, wheezing, or rubs  CARDIOVASCULAR: Regular rate and rhythm; No murmurs; no peripheral edema  GI: Soft, nontender, non distended, normal bowel sounds  SKIN: Dry, intact, No rashes or lesions, + chest tube, + bandage on chest  NEURO: sensation intact, extraocular movements intact, no tremor, normal reflexes  PSYCH: Alert and oriented x 3, normal mood     POCT Blood Glucose.: 166 mg/dL (12-17-17 @ 12:09)  POCT Blood Glucose.: 238 mg/dL (12-17-17 @ 11:12)  POCT Blood Glucose.: 212 mg/dL (12-17-17 @ 10:08)  POCT Blood Glucose.: 148 mg/dL (12-17-17 @ 06:52)  POCT Blood Glucose.: 135 mg/dL (12-17-17 @ 04:55)  POCT Blood Glucose.: 116 mg/dL (12-17-17 @ 04:02)  POCT Blood Glucose.: 116 mg/dL (12-17-17 @ 03:34)  POCT Blood Glucose.: 117 mg/dL (12-17-17 @ 01:54)  POCT Blood Glucose.: 101 mg/dL (12-17-17 @ 00:38)  POCT Blood Glucose.: 95 mg/dL (12-16-17 @ 23:56)  POCT Blood Glucose.: 114 mg/dL (12-16-17 @ 21:53)  POCT Blood Glucose.: 140 mg/dL (12-16-17 @ 21:01)  POCT Blood Glucose.: 97 mg/dL (12-16-17 @ 19:28)  POCT Blood Glucose.: 100 mg/dL (12-16-17 @ 18:35)  POCT Blood Glucose.: 171 mg/dL (12-15-17 @ 15:46)                            12.1   12.4  )-----------( 194      ( 17 Dec 2017 01:50 )             34.2       12-17    138  |  104  |  15  ----------------------------<  125<H>  4.9   |  19<L>  |  0.75    EGFR if : 110  EGFR if non : 95    Ca    8.6      12-17    TPro  6.1  /  Alb  3.9  /  TBili  0.6  /  DBili  x   /  AST  26  /  ALT  21  /  AlkPhos  33<L>  12-17      Thyroid Function Tests:  12-16 @ 02:23 TSH 3.15 FreeT4 -- T3 83 Anti TPO -- Anti Thyroglobulin Ab -- TSI --      Hemoglobin A1C, Whole Blood: 7.2 % <H> [4.0 - 5.6] (12-16-17 @ 02:23)      12-16 Chol 190  HDL 36<L> Trig 236<H>

## 2017-12-17 NOTE — PHYSICAL THERAPY INITIAL EVALUATION ADULT - GENERAL OBSERVATIONS, REHAB EVAL
Pt received seated in chair +cardiac monitor, BP cuff, central line, chest tube, external pacer 6L O2 NC, wife at bedside

## 2017-12-17 NOTE — PHYSICAL THERAPY INITIAL EVALUATION ADULT - PERTINENT HX OF CURRENT PROBLEM, REHAB EVAL
67y/oM presenting to [facility] with complaints of exertional CP X 3 wks. Pt states that he has been experiencing midsternal, non radiating CP when walking quickly. He saw his PCP on 12/15/17 who referred him to cardiologist. (cont below)

## 2017-12-17 NOTE — PROGRESS NOTE ADULT - SUBJECTIVE AND OBJECTIVE BOX
Subjective:   	 no chest pain   no shortness of breath       MEDICATIONS:  MEDICATIONS  (STANDING):  aspirin enteric coated 325 milliGRAM(s) Oral daily  atorvastatin 80 milliGRAM(s) Oral at bedtime  cefuroxime  IVPB 1500 milliGRAM(s) IV Intermittent every 8 hours  dextrose 5%. 1000 milliLiter(s) (50 mL/Hr) IV Continuous <Continuous>  dextrose 50% Injectable 12.5 Gram(s) IV Push once  dextrose 50% Injectable 25 Gram(s) IV Push once  dextrose 50% Injectable 25 Gram(s) IV Push once  docusate sodium 100 milliGRAM(s) Oral three times a day  enoxaparin Injectable 40 milliGRAM(s) SubCutaneous daily  insulin glargine Injectable (LANTUS) 34 Unit(s) SubCutaneous at bedtime  insulin Infusion 3 Unit(s)/Hr (3 mL/Hr) IV Continuous <Continuous>  insulin lispro (HumaLOG) corrective regimen sliding scale   SubCutaneous three times a day before meals  insulin lispro (HumaLOG) corrective regimen sliding scale   SubCutaneous at bedtime  insulin lispro Injectable (HumaLOG) 10 Unit(s) SubCutaneous three times a day before meals  metoclopramide Injectable 10 milliGRAM(s) IV Push every 8 hours  metoprolol     tartrate 25 milliGRAM(s) Oral every 8 hours  polyethylene glycol 3350 17 Gram(s) Oral daily  sodium chloride 0.9%. 1000 milliLiter(s) (10 mL/Hr) IV Continuous <Continuous>    MEDICATIONS  (PRN):  acetaminophen   Tablet. 650 milliGRAM(s) Oral every 6 hours PRN Mild Pain (1 - 3)  dextrose Gel 1 Dose(s) Oral once PRN Blood Glucose LESS THAN 70 milliGRAM(s)/deciLiter  glucagon  Injectable 1 milliGRAM(s) IntraMuscular once PRN Glucose <70 milliGRAM(s)/deciLiter  oxyCODONE    IR 5 milliGRAM(s) Oral every 4 hours PRN Moderate Pain (4 - 6)      LABS:	 	    CARDIAC MARKERS:  CARDIAC MARKERS ( 16 Dec 2017 12:37 )  x     / 0.26 ng/mL / 190 U/L / x     / 13.0 ng/mL  CARDIAC MARKERS ( 15 Dec 2017 23:36 )  x     / 0.08 ng/mL / 134 U/L / x     / 2.4 ng/mL  CARDIAC MARKERS ( 15 Dec 2017 16:10 )  x     / 0.06 ng/mL / 161 u/L / 3.17 ng/mL / x                                    12.1   12.4  )-----------( 194      ( 17 Dec 2017 01:50 )             34.2     12-17    138  |  104  |  15  ----------------------------<  125<H>  4.9   |  19<L>  |  0.75    Ca    8.6      17 Dec 2017 01:50    TPro  6.1  /  Alb  3.9  /  TBili  0.6  /  DBili  x   /  AST  26  /  ALT  21  /  AlkPhos  33<L>  12-17    COAGS:       proBNP:   Lipid Profile:   HgA1c:   TSH:       PHYSICAL EXAM:  T(C): 37.1 (12-17-17 @ 11:00), Max: 37.7 (12-17-17 @ 00:00)  HR: 86 (12-17-17 @ 15:00) (80 - 98)  BP: 131/64 (12-17-17 @ 15:00) (109/58 - 139/65)  RR: 27 (12-17-17 @ 15:00) (18 - 33)  SpO2: 100% (12-17-17 @ 15:00) (95% - 100%)  Wt(kg): --  I&O's Summary    16 Dec 2017 07:01  -  17 Dec 2017 07:00  --------------------------------------------------------  IN: 989 mL / OUT: 2170 mL / NET: -1181 mL    17 Dec 2017 07:01  -  17 Dec 2017 16:05  --------------------------------------------------------  IN: 400.5 mL / OUT: 405 mL / NET: -4.5 mL          	    Cardiovascular: Normal S1 S2,     No JVD, 1/6 ADRIANA murmur, Peripheral pulses palpable 2+ bilaterally  Respiratory: Lungs clear to auscultation, normal effort 	  Gastrointestinal:  Soft, Non-tender, + BS	  Extremities no edema, cyanosis, clubbing B/L LE's       TELEMETRY: SR 	    ECG:  NSR 1st degree AVB bifasicular block PVCs	  RADIOLOGY:     DIAGNOSTIC TESTING:  [ ] Echocardiogram:   [ ]  Catheterization:  diffuse multi vessel disease  [ ] Stress Test:    OTHER: 	      ASSESSMENT/PLAN: 	67y Male HTN, HLD, DM, known to our office with complaints of exertional dyspnea and exertional CP. His baseline EKG reveals NSR with 1AVB with bifasicular block.  He underwent nuclear stress test revealing severe anterolateral wall ischemia.  He denied any h/o syncope , near syncope or palpitations. He was admitted to [facility] for CATH which showed diffuse multi vessel disease  then transferred to [facility] for CTS eval    s/p C 2L EF nl  POD 1   cont medical management as per CTUI  pt w/o symptoms of heart block   no need for inpt EPS at this time

## 2017-12-17 NOTE — CONSULT NOTE ADULT - PROBLEM SELECTOR RECOMMENDATION 9
- patient is currently being managed on an insulin drip  - would discontinue premeal insulin and correctional insulin now as this can cause insulin stacking  - once ready to transition can give Lantus 18u at bedtime and discontinue insulin drip 2 hours afterwards  - start premeal humalog 6u TID and low correctional scale with meals and at bedtime  - on discharge, can increase metformin to 1g BID. Continue with jardiance and tresiba  - pt to follow up with Dr. Lowry (endo) - patient is currently being managed on an insulin drip  - once ready to transition can give Lantus 34u at bedtime and discontinue insulin drip 2 hours afterwards  - start premeal humalog 10u TID and moderate correctional scale with meals and at bedtime  - on discharge, can increase metformin to 1g BID. Continue with jardiance and tresiba  - pt to follow up with Dr. Lowry (endo)

## 2017-12-17 NOTE — PHYSICAL THERAPY INITIAL EVALUATION ADULT - ADDITIONAL COMMENTS
(cont from above):His baseline EKG reveals NSR with 1AVB with bifasicular block.  He underwent nuclear stress test revealing severe anterolateral wall ischemia. Was seen at [facility] where angiogram via radial approach demonstrated left main disease. Pt was subsequently transferred to [facility] CTU on 12/15/17 for CABG scheduled on 12/16/17. (cont from above):His baseline EKG reveals NSR with 1AVB with bifasicular block.  He underwent nuclear stress test revealing severe anterolateral wall ischemia. Was seen at [facility] where angiogram via radial approach demonstrated left main disease. Pt was subsequently transferred to [facility] CTU on 12/15/17 for CABG scheduled on 12/16/17.    Pt lives in a private home with wife and children, 3 steps to enter and first floor set-up inside home. Prior to admission pt was independent with all functional mobility and did not use an AD to ambulate. Pt states wife and children can assist upon d/c as needed.

## 2017-12-17 NOTE — PROGRESS NOTE ADULT - SUBJECTIVE AND OBJECTIVE BOX
Subjective:  pt seen and examined, no complaints on exam.  pt s/p C2L , POD 1  sitting in chair, Tele stable  chest tube with min output     acetaminophen   Tablet. 650 milliGRAM(s) Oral every 6 hours PRN  aspirin enteric coated 325 milliGRAM(s) Oral daily  atorvastatin 80 milliGRAM(s) Oral at bedtime  cefuroxime  IVPB 1500 milliGRAM(s) IV Intermittent every 8 hours  dextrose 5%. 1000 milliLiter(s) IV Continuous <Continuous>  dextrose 5%. 1000 milliLiter(s) IV Continuous <Continuous>  dextrose 50% Injectable 12.5 Gram(s) IV Push once  dextrose 50% Injectable 25 Gram(s) IV Push once  dextrose 50% Injectable 25 Gram(s) IV Push once  dextrose Gel 1 Dose(s) Oral once PRN  docusate sodium 100 milliGRAM(s) Oral three times a day  enoxaparin Injectable 40 milliGRAM(s) SubCutaneous daily  glucagon  Injectable 1 milliGRAM(s) IntraMuscular once PRN  insulin Infusion 3 Unit(s)/Hr IV Continuous <Continuous>  metoclopramide Injectable 10 milliGRAM(s) IV Push every 8 hours  metoprolol     tartrate 25 milliGRAM(s) Oral every 8 hours  oxyCODONE    IR 5 milliGRAM(s) Oral every 4 hours PRN  pantoprazole  Injectable 40 milliGRAM(s) IV Push daily  polyethylene glycol 3350 17 Gram(s) Oral daily  sodium chloride 0.9%. 1000 milliLiter(s) IV Continuous <Continuous>                            12.1   12.4  )-----------( 194      ( 17 Dec 2017 01:50 )             34.2       Hemoglobin: 12.1 g/dL (12-17 @ 01:50)  Hemoglobin: 12.1 g/dL (12-16 @ 18:47)  Hemoglobin: 12.0 g/dL (12-16 @ 12:37)  Hemoglobin: 14.5 g/dL (12-15 @ 23:36)  Hemoglobin: 14.9 g/dL (12-15 @ 16:10)      12-17    138  |  104  |  15  ----------------------------<  125<H>  4.9   |  19<L>  |  0.75    Ca    8.6      17 Dec 2017 01:50    TPro  6.1  /  Alb  3.9  /  TBili  0.6  /  DBili  x   /  AST  26  /  ALT  21  /  AlkPhos  33<L>  12-17    Creatinine Trend: 0.75<--, 0.78<--, 0.79<--, 0.89<--    COAGS: PT/INR - ( 16 Dec 2017 12:40 )   PT: 12.8 sec;   INR: 1.17 ratio         PTT - ( 16 Dec 2017 12:40 )  PTT:30.9 sec    CARDIAC MARKERS ( 16 Dec 2017 12:37 )  x     / 0.26 ng/mL / 190 U/L / x     / 13.0 ng/mL  CARDIAC MARKERS ( 15 Dec 2017 23:36 )  x     / 0.08 ng/mL / 134 U/L / x     / 2.4 ng/mL  CARDIAC MARKERS ( 15 Dec 2017 16:10 )  x     / 0.06 ng/mL / 161 u/L / 3.17 ng/mL / x            T(C): 37.6 (12-17-17 @ 04:00), Max: 37.7 (12-17-17 @ 00:00)  HR: 89 (12-17-17 @ 06:00) (65 - 98)  BP: --  RR: 30 (12-17-17 @ 06:00) (16 - 30)  SpO2: 95% (12-17-17 @ 06:00) (95% - 100%)  Wt(kg): --    I&O's Summary    15 Dec 2017 07:01  -  16 Dec 2017 07:00  --------------------------------------------------------  IN: 141 mL / OUT: 1900 mL / NET: -1759 mL    16 Dec 2017 07:01  -  17 Dec 2017 06:23  --------------------------------------------------------  IN: 976 mL / OUT: 2140 mL / NET: -1164 mL          HEENT:   Normal oral mucosa, PERRL, EOMI	  Lymphatic: No lymphadenopathy , no edema  Cardiovascular: Normal S1 S2, No JVD, No murmurs , Peripheral pulses palpable 2+ bilaterally  Respiratory: Lungs clear to auscultation, normal effort 	  Gastrointestinal:  Soft, Non-tender, + BS	  e    TELEMETRY: 	nsr      DIAGNOSTIC TESTING:  [ ] Echocardiogram:  intra op - KAE- NL lv fx  [ ]  Catheterization: diffuse multi vessel disease   OTHER: 	        ASSESSMENT/PLAN: 	67y Male hx of dm, htn, chol, obesity , now s/p C 2L  ef nl, POD 2    cont aggressive chest pt   cont BB increase as  BP tolerates  Asa , statin   pain mangement    GI / DVT prophylaxis.   keep K>4, mag >2.0   post op ABX per cts  D/W Dr Scruggs Subjective:  pt seen and examined, no complaints on exam.  pt s/p C2L , POD 1  sitting in chair, Tele stable  chest tube with min output     acetaminophen   Tablet. 650 milliGRAM(s) Oral every 6 hours PRN  aspirin enteric coated 325 milliGRAM(s) Oral daily  atorvastatin 80 milliGRAM(s) Oral at bedtime  cefuroxime  IVPB 1500 milliGRAM(s) IV Intermittent every 8 hours  dextrose 5%. 1000 milliLiter(s) IV Continuous <Continuous>  dextrose 5%. 1000 milliLiter(s) IV Continuous <Continuous>  dextrose 50% Injectable 12.5 Gram(s) IV Push once  dextrose 50% Injectable 25 Gram(s) IV Push once  dextrose 50% Injectable 25 Gram(s) IV Push once  dextrose Gel 1 Dose(s) Oral once PRN  docusate sodium 100 milliGRAM(s) Oral three times a day  enoxaparin Injectable 40 milliGRAM(s) SubCutaneous daily  glucagon  Injectable 1 milliGRAM(s) IntraMuscular once PRN  insulin Infusion 3 Unit(s)/Hr IV Continuous <Continuous>  metoclopramide Injectable 10 milliGRAM(s) IV Push every 8 hours  metoprolol     tartrate 25 milliGRAM(s) Oral every 8 hours  oxyCODONE    IR 5 milliGRAM(s) Oral every 4 hours PRN  pantoprazole  Injectable 40 milliGRAM(s) IV Push daily  polyethylene glycol 3350 17 Gram(s) Oral daily  sodium chloride 0.9%. 1000 milliLiter(s) IV Continuous <Continuous>                            12.1   12.4  )-----------( 194      ( 17 Dec 2017 01:50 )             34.2       Hemoglobin: 12.1 g/dL (12-17 @ 01:50)  Hemoglobin: 12.1 g/dL (12-16 @ 18:47)  Hemoglobin: 12.0 g/dL (12-16 @ 12:37)  Hemoglobin: 14.5 g/dL (12-15 @ 23:36)  Hemoglobin: 14.9 g/dL (12-15 @ 16:10)      12-17    138  |  104  |  15  ----------------------------<  125<H>  4.9   |  19<L>  |  0.75    Ca    8.6      17 Dec 2017 01:50    TPro  6.1  /  Alb  3.9  /  TBili  0.6  /  DBili  x   /  AST  26  /  ALT  21  /  AlkPhos  33<L>  12-17    Creatinine Trend: 0.75<--, 0.78<--, 0.79<--, 0.89<--    COAGS: PT/INR - ( 16 Dec 2017 12:40 )   PT: 12.8 sec;   INR: 1.17 ratio         PTT - ( 16 Dec 2017 12:40 )  PTT:30.9 sec    CARDIAC MARKERS ( 16 Dec 2017 12:37 )  x     / 0.26 ng/mL / 190 U/L / x     / 13.0 ng/mL  CARDIAC MARKERS ( 15 Dec 2017 23:36 )  x     / 0.08 ng/mL / 134 U/L / x     / 2.4 ng/mL  CARDIAC MARKERS ( 15 Dec 2017 16:10 )  x     / 0.06 ng/mL / 161 u/L / 3.17 ng/mL / x            T(C): 37.6 (12-17-17 @ 04:00), Max: 37.7 (12-17-17 @ 00:00)  HR: 89 (12-17-17 @ 06:00) (65 - 98)  BP: --  RR: 30 (12-17-17 @ 06:00) (16 - 30)  SpO2: 95% (12-17-17 @ 06:00) (95% - 100%)  Wt(kg): --    I&O's Summary    15 Dec 2017 07:01  -  16 Dec 2017 07:00  --------------------------------------------------------  IN: 141 mL / OUT: 1900 mL / NET: -1759 mL    16 Dec 2017 07:01  -  17 Dec 2017 06:23  --------------------------------------------------------  IN: 976 mL / OUT: 2140 mL / NET: -1164 mL          HEENT:   Normal oral mucosa, PERRL, EOMI	  Lymphatic: No lymphadenopathy , no edema  Cardiovascular: Normal S1 S2, No JVD, No murmurs , Peripheral pulses palpable 2+ bilaterally  Respiratory: Lungs clear to auscultation, normal effort 	  Gastrointestinal:  Soft, Non-tender, + BS	  e    TELEMETRY: 	nsr      DIAGNOSTIC TESTING:  [ ] Echocardiogram:  intra op - KAE- NL lv fx  [ ]  Catheterization: diffuse multi vessel disease   OTHER: 	        ASSESSMENT/PLAN: 	67y Male hx of dm, htn, chol, obesity , now s/p C 2L  ef nl, POD 1    cont aggressive chest pt   cont BB increase as  BP tolerates  Asa , statin   pain mangement    GI / DVT prophylaxis.   keep K>4, mag >2.0   post op ABX per cts  D/W Dr Scruggs

## 2017-12-18 DIAGNOSIS — Z95.1 PRESENCE OF AORTOCORONARY BYPASS GRAFT: ICD-10-CM

## 2017-12-18 DIAGNOSIS — Z97.8 PRESENCE OF OTHER SPECIFIED DEVICES: ICD-10-CM

## 2017-12-18 LAB
ALBUMIN SERPL ELPH-MCNC: 3.7 G/DL — SIGNIFICANT CHANGE UP (ref 3.3–5)
ALP SERPL-CCNC: 33 U/L — LOW (ref 40–120)
ALT FLD-CCNC: 21 U/L RC — SIGNIFICANT CHANGE UP (ref 10–45)
ANION GAP SERPL CALC-SCNC: 12 MMOL/L — SIGNIFICANT CHANGE UP (ref 5–17)
APTT BLD: 23.3 SEC — LOW (ref 27.5–37.4)
AST SERPL-CCNC: 21 U/L — SIGNIFICANT CHANGE UP (ref 10–40)
BILIRUB SERPL-MCNC: 0.5 MG/DL — SIGNIFICANT CHANGE UP (ref 0.2–1.2)
BUN SERPL-MCNC: 23 MG/DL — SIGNIFICANT CHANGE UP (ref 7–23)
CALCIUM SERPL-MCNC: 8.5 MG/DL — SIGNIFICANT CHANGE UP (ref 8.4–10.5)
CHLORIDE SERPL-SCNC: 101 MMOL/L — SIGNIFICANT CHANGE UP (ref 96–108)
CO2 SERPL-SCNC: 23 MMOL/L — SIGNIFICANT CHANGE UP (ref 22–31)
CREAT SERPL-MCNC: 0.71 MG/DL — SIGNIFICANT CHANGE UP (ref 0.5–1.3)
GLUCOSE BLDC GLUCOMTR-MCNC: 100 MG/DL — HIGH (ref 70–99)
GLUCOSE BLDC GLUCOMTR-MCNC: 112 MG/DL — HIGH (ref 70–99)
GLUCOSE BLDC GLUCOMTR-MCNC: 112 MG/DL — HIGH (ref 70–99)
GLUCOSE BLDC GLUCOMTR-MCNC: 135 MG/DL — HIGH (ref 70–99)
GLUCOSE BLDC GLUCOMTR-MCNC: 68 MG/DL — LOW (ref 70–99)
GLUCOSE BLDC GLUCOMTR-MCNC: 76 MG/DL — SIGNIFICANT CHANGE UP (ref 70–99)
GLUCOSE BLDC GLUCOMTR-MCNC: 81 MG/DL — SIGNIFICANT CHANGE UP (ref 70–99)
GLUCOSE BLDC GLUCOMTR-MCNC: 87 MG/DL — SIGNIFICANT CHANGE UP (ref 70–99)
GLUCOSE BLDC GLUCOMTR-MCNC: 87 MG/DL — SIGNIFICANT CHANGE UP (ref 70–99)
GLUCOSE BLDC GLUCOMTR-MCNC: 97 MG/DL — SIGNIFICANT CHANGE UP (ref 70–99)
GLUCOSE BLDC GLUCOMTR-MCNC: 98 MG/DL — SIGNIFICANT CHANGE UP (ref 70–99)
GLUCOSE BLDC GLUCOMTR-MCNC: 99 MG/DL — SIGNIFICANT CHANGE UP (ref 70–99)
GLUCOSE BLDC GLUCOMTR-MCNC: 99 MG/DL — SIGNIFICANT CHANGE UP (ref 70–99)
GLUCOSE SERPL-MCNC: 96 MG/DL — SIGNIFICANT CHANGE UP (ref 70–99)
HCT VFR BLD CALC: 35.7 % — LOW (ref 39–50)
HGB BLD-MCNC: 11.8 G/DL — LOW (ref 13–17)
INR BLD: 1.14 RATIO — SIGNIFICANT CHANGE UP (ref 0.88–1.16)
MCHC RBC-ENTMCNC: 30.7 PG — SIGNIFICANT CHANGE UP (ref 27–34)
MCHC RBC-ENTMCNC: 33.1 GM/DL — SIGNIFICANT CHANGE UP (ref 32–36)
MCV RBC AUTO: 92.7 FL — SIGNIFICANT CHANGE UP (ref 80–100)
PLATELET # BLD AUTO: 192 K/UL — SIGNIFICANT CHANGE UP (ref 150–400)
POTASSIUM SERPL-MCNC: 4.3 MMOL/L — SIGNIFICANT CHANGE UP (ref 3.5–5.3)
POTASSIUM SERPL-SCNC: 4.3 MMOL/L — SIGNIFICANT CHANGE UP (ref 3.5–5.3)
PROT SERPL-MCNC: 6.3 G/DL — SIGNIFICANT CHANGE UP (ref 6–8.3)
PROTHROM AB SERPL-ACNC: 12.3 SEC — SIGNIFICANT CHANGE UP (ref 9.8–12.7)
RBC # BLD: 3.85 M/UL — LOW (ref 4.2–5.8)
RBC # FLD: 12.6 % — SIGNIFICANT CHANGE UP (ref 10.3–14.5)
SODIUM SERPL-SCNC: 136 MMOL/L — SIGNIFICANT CHANGE UP (ref 135–145)
WBC # BLD: 15.2 K/UL — HIGH (ref 3.8–10.5)
WBC # FLD AUTO: 15.2 K/UL — HIGH (ref 3.8–10.5)

## 2017-12-18 PROCEDURE — 99232 SBSQ HOSP IP/OBS MODERATE 35: CPT

## 2017-12-18 PROCEDURE — 71010: CPT | Mod: 26

## 2017-12-18 RX ORDER — INSULIN LISPRO 100/ML
VIAL (ML) SUBCUTANEOUS
Qty: 0 | Refills: 0 | Status: DISCONTINUED | OUTPATIENT
Start: 2017-12-18 | End: 2017-12-21

## 2017-12-18 RX ORDER — INSULIN GLARGINE 100 [IU]/ML
30 INJECTION, SOLUTION SUBCUTANEOUS AT BEDTIME
Qty: 0 | Refills: 0 | Status: DISCONTINUED | OUTPATIENT
Start: 2017-12-18 | End: 2017-12-19

## 2017-12-18 RX ORDER — METOPROLOL TARTRATE 50 MG
50 TABLET ORAL
Qty: 0 | Refills: 0 | Status: DISCONTINUED | OUTPATIENT
Start: 2017-12-18 | End: 2017-12-20

## 2017-12-18 RX ORDER — INSULIN LISPRO 100/ML
VIAL (ML) SUBCUTANEOUS AT BEDTIME
Qty: 0 | Refills: 0 | Status: DISCONTINUED | OUTPATIENT
Start: 2017-12-18 | End: 2017-12-21

## 2017-12-18 RX ADMIN — ATORVASTATIN CALCIUM 80 MILLIGRAM(S): 80 TABLET, FILM COATED ORAL at 22:41

## 2017-12-18 RX ADMIN — Medication 10 UNIT(S): at 12:24

## 2017-12-18 RX ADMIN — Medication 50 MILLIGRAM(S): at 16:57

## 2017-12-18 RX ADMIN — Medication 100 MILLIGRAM(S): at 05:33

## 2017-12-18 RX ADMIN — Medication 325 MILLIGRAM(S): at 12:24

## 2017-12-18 RX ADMIN — Medication 25 MILLIGRAM(S): at 13:35

## 2017-12-18 RX ADMIN — ENOXAPARIN SODIUM 40 MILLIGRAM(S): 100 INJECTION SUBCUTANEOUS at 12:24

## 2017-12-18 RX ADMIN — Medication 100 MILLIGRAM(S): at 00:23

## 2017-12-18 RX ADMIN — Medication 25 MILLIGRAM(S): at 05:33

## 2017-12-18 RX ADMIN — Medication 10 UNIT(S): at 16:57

## 2017-12-18 RX ADMIN — Medication 10 UNIT(S): at 08:08

## 2017-12-18 RX ADMIN — POLYETHYLENE GLYCOL 3350 17 GRAM(S): 17 POWDER, FOR SOLUTION ORAL at 12:24

## 2017-12-18 RX ADMIN — Medication 100 MILLIGRAM(S): at 13:35

## 2017-12-18 RX ADMIN — INSULIN GLARGINE 30 UNIT(S): 100 INJECTION, SOLUTION SUBCUTANEOUS at 22:42

## 2017-12-18 RX ADMIN — Medication 100 MILLIGRAM(S): at 22:41

## 2017-12-18 NOTE — PROGRESS NOTE ADULT - ASSESSMENT
67M with pmh of HTN, HLD, DM who presented to cardiologist office with complaints of exertional dyspnea and exertional CP.  He underwent nuclear stress test revealing severe anterolateral wall ischemia and was subsequently admitted to [facility] for CATH which showed diffuse multi vessel coronary disease.    Underwent C 2L on 12/16.    Post-operative course:    12/17: Transferred to step down  D/c chest tube today  AmbulateBeta blockers  ok to transfer to floor

## 2017-12-18 NOTE — PROGRESS NOTE ADULT - PROBLEM SELECTOR PLAN 1
De-intensify:  Remove IJ/Bree  +pacing wires  external pacer  follow up AM labs  Keep K>4.0  Mg>2.0  progressive ambulation  pulmonary toileting/incentive spirometry  pain management

## 2017-12-18 NOTE — DIETITIAN INITIAL EVALUATION ADULT. - DIET TYPE
DASH/TLC (sodium and cholesterol restricted diet)/consistent carbohydrate (no snacks)/LactoVegetarian (accepts milk products) DASH/TLC (sodium and cholesterol restricted diet)/consistent carbohydrate (no snacks)/LactoOvoVegetarian (accepts milk products)

## 2017-12-18 NOTE — DIETITIAN INITIAL EVALUATION ADULT. - NS AS NUTRI INTERV MEALS SNACK
General/healthful diet/Mineral - modified diet/Carbohydrate - modified diet/1) Continue DASH/TLC diet, recommend change to consistent CHO with evening snack diet to help prevent overnight hypoglycemia. Pt's food preferences obtained and honored on menu.

## 2017-12-18 NOTE — DIETITIAN INITIAL EVALUATION ADULT. - ENERGY NEEDS
ht: 66 inches. wt: 195.9 pounds, current, standing, +1 generalized edema). BMI: 31.6 kG/m2. UBW:  IBW: 142 pounds +/- 10%. %IBW: 138%  Other pertinent objective information: 67 year old male pt with PMH  controlled T2DM on Tresiba plus Oral DM meds, HTN, HLD. Presented with CP/SOB found to have mulitvessel disease now s/p CABG x 2 on 12/16/17. Reports eating >50% of meals. Endo notes glycemic control mostly at goal except for overnight BG reading of 68 with f/u BG 98. Per endo Lantus decreased, may resume home regimen or start on basal/bolus insulin upon d/c. No pressure ulcers noted. ht: 66 inches. wt: 195.9 pounds, current, standing, +1 generalized edema). BMI: 31.6 kG/m2. UBW: 210 pounds x 3 months ago. IBW: 142 pounds +/- 10%. %IBW: 138%  Other pertinent objective information: 67 year old male pt with PMH  controlled T2DM on Tresiba plus Oral DM meds, HTN, HLD. Presented with CP/SOB found to have mulitvessel disease now s/p CABG x 2 on 12/16/17. Reports eating >50% of meals. Endo notes glycemic control mostly at goal except for overnight BG reading of 68 with f/u BG 98. Per endo Lantus decreased, may resume home regimen or start on basal/bolus insulin upon d/c. No pressure ulcers noted.

## 2017-12-18 NOTE — DIETITIAN INITIAL EVALUATION ADULT. - ORAL INTAKE PTA
good/Pt reports good po intake PTA. Typical meal intake for breakfast: egg whites with bread; Typical meal intake for lunch and dinner: often includes rice, vegetables, cope sauce. Pt snacks on cashews and other nuts. States he is a vegetarian but consumes dairy and at times eggs. Pt denies taking vitamin/mineral/herbal supplements PTA. DM medication PTA: Tresiba, oral DM medications.

## 2017-12-18 NOTE — PROGRESS NOTE ADULT - SUBJECTIVE AND OBJECTIVE BOX
Patient denies CP, SOB ROS (-)    acetaminophen   Tablet. 650 milliGRAM(s) Oral every 6 hours PRN  aspirin enteric coated 325 milliGRAM(s) Oral daily  atorvastatin 80 milliGRAM(s) Oral at bedtime  dextrose 5%. 1000 milliLiter(s) IV Continuous <Continuous>  dextrose 50% Injectable 12.5 Gram(s) IV Push once  dextrose 50% Injectable 25 Gram(s) IV Push once  dextrose 50% Injectable 25 Gram(s) IV Push once  dextrose Gel 1 Dose(s) Oral once PRN  docusate sodium 100 milliGRAM(s) Oral three times a day  enoxaparin Injectable 40 milliGRAM(s) SubCutaneous daily  glucagon  Injectable 1 milliGRAM(s) IntraMuscular once PRN  insulin glargine Injectable (LANTUS) 34 Unit(s) SubCutaneous at bedtime  insulin Infusion 3 Unit(s)/Hr IV Continuous <Continuous>  insulin lispro (HumaLOG) corrective regimen sliding scale   SubCutaneous three times a day before meals  insulin lispro (HumaLOG) corrective regimen sliding scale   SubCutaneous at bedtime  insulin lispro Injectable (HumaLOG) 10 Unit(s) SubCutaneous three times a day before meals  metoprolol     tartrate 25 milliGRAM(s) Oral every 8 hours  oxyCODONE    IR 5 milliGRAM(s) Oral every 4 hours PRN  polyethylene glycol 3350 17 Gram(s) Oral daily  sodium chloride 0.9%. 1000 milliLiter(s) IV Continuous <Continuous>                            11.8   15.2  )-----------( 192      ( 18 Dec 2017 03:16 )             35.7       Hemoglobin: 11.8 g/dL (12-18 @ 03:16)  Hemoglobin: 12.1 g/dL (12-17 @ 01:50)  Hemoglobin: 12.1 g/dL (12-16 @ 18:47)  Hemoglobin: 12.0 g/dL (12-16 @ 12:37)  Hemoglobin: 14.5 g/dL (12-15 @ 23:36)      12-18    136  |  101  |  23  ----------------------------<  96  4.3   |  23  |  0.71    Ca    8.5      18 Dec 2017 03:16    TPro  6.3  /  Alb  3.7  /  TBili  0.5  /  DBili  x   /  AST  21  /  ALT  21  /  AlkPhos  33<L>  12-18    Creatinine Trend: 0.71<--, 0.75<--, 0.78<--, 0.79<--, 0.89<--    COAGS: PT/INR - ( 18 Dec 2017 03:16 )   PT: 12.3 sec;   INR: 1.14 ratio         PTT - ( 18 Dec 2017 03:16 )  PTT:23.3 sec    CARDIAC MARKERS ( 16 Dec 2017 12:37 )  x     / 0.26 ng/mL / 190 U/L / x     / 13.0 ng/mL  CARDIAC MARKERS ( 15 Dec 2017 23:36 )  x     / 0.08 ng/mL / 134 U/L / x     / 2.4 ng/mL  CARDIAC MARKERS ( 15 Dec 2017 16:10 )  x     / 0.06 ng/mL / 161 u/L / 3.17 ng/mL / x            T(C): 36.7 (12-18-17 @ 10:58), Max: 37 (12-17-17 @ 15:00)  HR: 93 (12-18-17 @ 10:58) (75 - 125)  BP: 137/68 (12-18-17 @ 10:58) (115/65 - 152/70)  RR: 18 (12-18-17 @ 10:58) (17 - 27)  SpO2: 93% (12-18-17 @ 10:58) (93% - 100%)  Wt(kg): --    I&O's Summary    17 Dec 2017 07:01  -  18 Dec 2017 07:00  --------------------------------------------------------  IN: 1283 mL / OUT: 1343 mL / NET: -60 mL    18 Dec 2017 07:01  -  18 Dec 2017 13:16  --------------------------------------------------------  IN: 600 mL / OUT: 308 mL / NET: 292 mL          HEENT:   Normal oral mucosa, PERRL, EOMI	  Lymphatic: No lymphadenopathy , no edema  Cardiovascular: Normal S1 S2, No JVD, No murmurs , Peripheral pulses palpable 2+ bilaterally  Respiratory: Lungs clear to auscultation, normal effort 	  Gastrointestinal:  Soft, Non-tender, + BS	  e    TELEMETRY: 	nsr      DIAGNOSTIC TESTING:  [ ] Echocardiogram:  intra op - KAE- NL lv fx  [ ]  Catheterization: diffuse multi vessel disease   OTHER: 	        ASSESSMENT/PLAN: 	67y Male hx of dm, htn, chol, obesity , now s/p C 2L  ef nl, Post op      - Progressing well  - Cont Asa and statin  - Tolerating BB    Piotr Scruggs MD, FACC  [facility], [facility]  [address] [place] [address]  PHONE:  [phone]  BEEPER : [phone]

## 2017-12-18 NOTE — PROGRESS NOTE ADULT - SUBJECTIVE AND OBJECTIVE BOX
DIABETES FOLLOW UP NOTE: Saw pt earlier today  INTERVAL HX: 67 y/0 M w/h/o controlled T2DM on Tresiba plus Oral DM meds. Also, HTN/HLD. Here with CP/SOB FOUND TO HAVE mvd NOW S/P cabg x2 ON 12/16. Reports eating >50% of meals. Glycemic control mostly at goal except for overnight BG reading of 68 with f/u BG 98. Pt reports no symptoms. BG GOAL 100S TO 180S.      Review of Systems: "I'm sore, they just took out the chest tubes"  Cardiovascular: No chest pain, palpitations  Respiratory: No SOB, no cough  GI: No nausea, vomiting, abdominal pain  Endocrine: no polyuria, polydipsia or S&Sx of hypoglycemia    Allergies    No Known Allergies    Intolerances      MEDICATIONS :  atorvastatin 80 milliGRAM(s) Oral at bedtime  insulin glargine Injectable (LANTUS) 34 Unit(s) SubCutaneous at bedtime  insulin Infusion 3 Unit(s)/Hr (3 mL/Hr) IV Continuous <Continuous>  insulin lispro (HumaLOG) corrective regimen sliding scale   SubCutaneous three times a day before meals  insulin lispro (HumaLOG) corrective regimen sliding scale   SubCutaneous at bedtime  insulin lispro Injectable (HumaLOG) 10 Unit(s) SubCutaneous three times a day before meals      PHYSICAL EXAM:  VITALS: T(C): 36.7 (12-18-17 @ 10:58)  T(F): 98.1 (12-18-17 @ 10:58), Max: 98.6 (12-17-17 @ 17:28)  HR: 93 (12-18-17 @ 10:58) (75 - 125)  BP: 137/68 (12-18-17 @ 10:58) (115/65 - 152/70)  RR:  (17 - 22)  SpO2:  (93% - 100%)  Wt(kg): --  GENERAL: Male lying in bed in NAD. Wife at bedside.  Chest: Sterna incision D&I.  Abdomen: Soft, nontender, non distended, central adiposity  Extremities: Warm, No edema in all 4 exts.  LLE graft donor site D&I  NEURO: A&O x3    LABS:  POCT Blood Glucose.: 135 mg/dL (12-18-17 @ 11:56)  POCT Blood Glucose.: 112 mg/dL (12-18-17 @ 07:10)  POCT Blood Glucose.: 100 mg/dL (12-18-17 @ 03:35)  POCT Blood Glucose.: 99 mg/dL (12-18-17 @ 03:11)  POCT Blood Glucose.: 98 mg/dL (12-18-17 @ 02:42)  POCT Blood Glucose.: 68 mg/dL (12-18-17 @ 02:41)  POCT Blood Glucose.: 99 mg/dL (12-18-17 @ 02:12)  POCT Blood Glucose.: 87 mg/dL (12-18-17 @ 01:39)  POCT Blood Glucose.: 97 mg/dL (12-18-17 @ 01:04)  POCT Blood Glucose.: 87 mg/dL (12-18-17 @ 00:25)  POCT Blood Glucose.: 81 mg/dL (12-17-17 @ 23:47)  POCT Blood Glucose.: 88 mg/dL (12-17-17 @ 23:15)  POCT Blood Glucose.: 107 mg/dL (12-17-17 @ 22:05)  POCT Blood Glucose.: 137 mg/dL (12-17-17 @ 21:05)  POCT Blood Glucose.: 139 mg/dL (12-17-17 @ 20:06)  POCT Blood Glucose.: 193 mg/dL (12-17-17 @ 19:04)  POCT Blood Glucose.: 129 mg/dL (12-17-17 @ 18:01)  POCT Blood Glucose.: 151 mg/dL (12-17-17 @ 16:50)  POCT Blood Glucose.: 181 mg/dL (12-17-17 @ 16:06)  POCT Blood Glucose.: 200 mg/dL (12-17-17 @ 15:23)  POCT Blood Glucose.: 112 mg/dL (12-17-17 @ 13:56)  POCT Blood Glucose.: 134 mg/dL (12-17-17 @ 13:06)  POCT Blood Glucose.: 166 mg/dL (12-17-17 @ 12:09)  POCT Blood Glucose.: 238 mg/dL (12-17-17 @ 11:12)  POCT Blood Glucose.: 212 mg/dL (12-17-17 @ 10:08)  POCT Blood Glucose.: 148 mg/dL (12-17-17 @ 06:52)  POCT Blood Glucose.: 135 mg/dL (12-17-17 @ 04:55)  POCT Blood Glucose.: 116 mg/dL (12-17-17 @ 04:02)  POCT Blood Glucose.: 116 mg/dL (12-17-17 @ 03:34)  POCT Blood Glucose.: 117 mg/dL (12-17-17 @ 01:54)  POCT Blood Glucose.: 101 mg/dL (12-17-17 @ 00:38)                            11.8   15.2  )-----------( 192      ( 18 Dec 2017 03:16 )             35.7       12-18    136  |  101  |  23  ----------------------------<  96  4.3   |  23  |  0.71    EGFR if : 113  EGFR if non : 97    Ca    8.5      12-18    TPro  6.3  /  Alb  3.7  /  TBili  0.5  /  DBili  x   /  AST  21  /  ALT  21  /  AlkPhos  33<L>  12-18      Thyroid Function Tests:  12-16 @ 02:23 TSH 3.15 FreeT4 -- T3 83 Anti TPO -- Anti Thyroglobulin Ab -- TSI --      Hemoglobin A1C, Whole Blood: 7.2 % <H> [4.0 - 5.6] (12-16-17 @ 02:23)      12-16 Chol 190  HDL 36<L> Trig 236<H>

## 2017-12-18 NOTE — PROGRESS NOTE ADULT - SUBJECTIVE AND OBJECTIVE BOX
hello im ok    VITAL SIGNS    Telemetry:  NSR 80    Vital Signs Last 24 Hrs  T(C): 36.7 (17 @ 07:04), Max: 37.1 (17 @ 11:00)  T(F): 98.1 (17 @ 07:04), Max: 98.7 (17 @ 11:00)  HR: 125 (17 @ 07:04) (75 - 125)  BP: 125/69 (17 @ 07:04) (115/65 - 152/70)  RR: 18 (17 @ 07:04) (17 - 33)  SpO2: 99% (17 @ 07:04) (96% - 100%)                    @ 07:01  -   @ 07:00  --------------------------------------------------------  IN: 1283 mL / OUT: 1343 mL / NET: -60 mL     @ 07:01  -   @ 10:11  --------------------------------------------------------  IN: 240 mL / OUT: 8 mL / NET: 232 mL          Daily     Daily Weight in k.9 (18 Dec 2017 05:18)        CAPILLARY BLOOD GLUCOSE  99 (18 Dec 2017 02:14)  181 (17 Dec 2017 16:00)  200 (17 Dec 2017 15:00)  112 (17 Dec 2017 14:00)  134 (17 Dec 2017 13:00)  166 (17 Dec 2017 12:00)  238 (17 Dec 2017 11:00)      POCT Blood Glucose.: 112 mg/dL (18 Dec 2017 07:10)  POCT Blood Glucose.: 100 mg/dL (18 Dec 2017 03:35)  POCT Blood Glucose.: 99 mg/dL (18 Dec 2017 03:11)  POCT Blood Glucose.: 98 mg/dL (18 Dec 2017 02:42)  POCT Blood Glucose.: 68 mg/dL (18 Dec 2017 02:41)  POCT Blood Glucose.: 99 mg/dL (18 Dec 2017 02:12)  POCT Blood Glucose.: 87 mg/dL (18 Dec 2017 01:39)  POCT Blood Glucose.: 97 mg/dL (18 Dec 2017 01:04)  POCT Blood Glucose.: 87 mg/dL (18 Dec 2017 00:25)  POCT Blood Glucose.: 81 mg/dL (17 Dec 2017 23:47)  POCT Blood Glucose.: 88 mg/dL (17 Dec 2017 23:15)  POCT Blood Glucose.: 107 mg/dL (17 Dec 2017 22:05)  POCT Blood Glucose.: 137 mg/dL (17 Dec 2017 21:05)  POCT Blood Glucose.: 139 mg/dL (17 Dec 2017 20:06)  POCT Blood Glucose.: 193 mg/dL (17 Dec 2017 19:04)  POCT Blood Glucose.: 129 mg/dL (17 Dec 2017 18:01)  POCT Blood Glucose.: 151 mg/dL (17 Dec 2017 16:50)  POCT Blood Glucose.: 181 mg/dL (17 Dec 2017 16:06)  POCT Blood Glucose.: 200 mg/dL (17 Dec 2017 15:23)  POCT Blood Glucose.: 112 mg/dL (17 Dec 2017 13:56)  POCT Blood Glucose.: 134 mg/dL (17 Dec 2017 13:06)  POCT Blood Glucose.: 166 mg/dL (17 Dec 2017 12:09)  POCT Blood Glucose.: 238 mg/dL (17 Dec 2017 11:12)                  Coumadin    [ ] YES          [  ]      NO         Reason:                               PHYSICAL EXAM        Neurology: alert and oriented x 3, nonfocal, no gross deficits  CV : S1 S2 , RRR   Sternal Wound :  CDI , Stable  Pacing Wires        [  ]   Settings:                                  Isolated  [x  ]  Lungs: bibasilar crackles  Drains:     MS         [  ] Drainage:                 L Pleural  [ x ]  Drainage:                R Pleural  [  ]  Drainage:  Abdomen: soft, nontender, nondistended, positive bowel sounds,   : voiding            Extremities:     trace edema - calve tenderness          acetaminophen   Tablet. 650 milliGRAM(s) Oral every 6 hours PRN  aspirin enteric coated 325 milliGRAM(s) Oral daily  atorvastatin 80 milliGRAM(s) Oral at bedtime  dextrose 5%. 1000 milliLiter(s) IV Continuous <Continuous>  dextrose 50% Injectable 12.5 Gram(s) IV Push once  dextrose 50% Injectable 25 Gram(s) IV Push once  dextrose 50% Injectable 25 Gram(s) IV Push once  dextrose Gel 1 Dose(s) Oral once PRN  docusate sodium 100 milliGRAM(s) Oral three times a day  enoxaparin Injectable 40 milliGRAM(s) SubCutaneous daily  glucagon  Injectable 1 milliGRAM(s) IntraMuscular once PRN  insulin glargine Injectable (LANTUS) 34 Unit(s) SubCutaneous at bedtime  insulin Infusion 3 Unit(s)/Hr IV Continuous <Continuous>  insulin lispro (HumaLOG) corrective regimen sliding scale   SubCutaneous three times a day before meals  insulin lispro (HumaLOG) corrective regimen sliding scale   SubCutaneous at bedtime  insulin lispro Injectable (HumaLOG) 10 Unit(s) SubCutaneous three times a day before meals  metoprolol     tartrate 25 milliGRAM(s) Oral every 8 hours  oxyCODONE    IR 5 milliGRAM(s) Oral every 4 hours PRN  polyethylene glycol 3350 17 Gram(s) Oral daily  sodium chloride 0.9%. 1000 milliLiter(s) IV Continuous <Continuous>                    Physical Therapy Rec:   Home  [  ]   Home w/ PT  [ x ]  Rehab  [  ]  Discussed with Cardiothoracic Team at AM rounds.

## 2017-12-18 NOTE — PROGRESS NOTE ADULT - SUBJECTIVE AND OBJECTIVE BOX
Subjective: No Pain or SOB    MEDICATIONS  (STANDING):  aspirin enteric coated 325 milliGRAM(s) Oral daily  atorvastatin 80 milliGRAM(s) Oral at bedtime  docusate sodium 100 milliGRAM(s) Oral three times a day  enoxaparin Injectable 40 milliGRAM(s) SubCutaneous daily  insulin glargine Injectable (LANTUS) 34 Unit(s) SubCutaneous at bedtime  insulin Infusion 3 Unit(s)/Hr (3 mL/Hr) IV Continuous <Continuous>  insulin lispro (HumaLOG) corrective regimen sliding scale   SubCutaneous three times a day before meals  insulin lispro (HumaLOG) corrective regimen sliding scale   SubCutaneous at bedtime  insulin lispro Injectable (HumaLOG) 10 Unit(s) SubCutaneous three times a day before meals  metoprolol     tartrate 50 milliGRAM(s) Oral two times a day  polyethylene glycol 3350 17 Gram(s) Oral daily      LABS:                        11.8   15.2  )-----------( 192      ( 18 Dec 2017 03:16 )             35.7    136  |  101  |  23  ----------------------------<  96  4.3   |  23  |  0.71    Ca    8.5      18 Dec 2017 03:16    TPro  6.3  /  Alb  3.7  /  TBili  0.5  /  DBili  x   /  AST  21  /  ALT  21  /  AlkPhos  33<L>  12-18    Creatinine Trend: 0.71<--, 0.75<--, 0.78<--, 0.79<--, 0.89<--   PT/INR - ( 18 Dec 2017 03:16 )   PT: 12.3 sec;   INR: 1.14 ratio    PTT - ( 18 Dec 2017 03:16 )  PTT:23.3 sec    CARDIAC MARKERS ( 16 Dec 2017 12:37 )  x     / 0.26 ng/mL / 190 U/L / x     / 13.0 ng/mL  CARDIAC MARKERS ( 15 Dec 2017 23:36 )  x     / 0.08 ng/mL / 134 U/L / x     / 2.4 ng/mL  CARDIAC MARKERS ( 15 Dec 2017 16:10 )  x     / 0.06 ng/mL / 161 u/L / 3.17 ng/mL / x        PHYSICAL EXAM  Vital Signs Last 24 Hrs  T(C): 36.7 (18 Dec 2017 10:58), Max: 37 (17 Dec 2017 17:28)  T(F): 98.1 (18 Dec 2017 10:58), Max: 98.6 (17 Dec 2017 17:28)  HR: 93 (18 Dec 2017 10:58) (75 - 125)  BP: 137/68 (18 Dec 2017 10:58) (115/65 - 152/70)  BP(mean): 84 (18 Dec 2017 05:18) (84 - 105)  RR: 18 (18 Dec 2017 10:58) (17 - 22)  SpO2: 93% (18 Dec 2017 10:58) (93% - 100%)  	  Cardiovascular: S1S2 RRR  Respiratory: Lungs clear to auscultation, normal effort 	  Gastrointestinal:  Soft, Non-tender, + BS	  Skin: No rashes,  No cyanosis, warm to touch  Ext: no C/C/E B/L LE    TELEMETRY: 	SR      ASSESSMENT/PLAN: 	67 yr old Male with PMH HTN, HLD, DM who presented to our office with exertional chest pain, underwent NST revealing anterolateral ischemia and s/p cardiac cath 12/15 which revealed LM disease.  Now s/p CABG x 2 on 12/16/2017  (LIMA to LAD, SVG to OM), intra-op KAE with normal LV function  --on asa, statin, Metoprolol  --Post op care per CTS  --PT/Pain management/incentive spirometry    Nicolle Butts PA-C  [facility]  [address], [address]   [place] [address]  office [phone]  pager [phone]

## 2017-12-18 NOTE — DIETITIAN INITIAL EVALUATION ADULT. - OTHER INFO
Pt seen for consult: T2DM diet/management and heart healthy diet education, HgA1c: 7.2%. Pt currently reports fair appetite s/p cardiac surgery, able to consume ~50% of meals. Pt denies N/V/diarrhea but admits to constipation s/p surgery, took Miralax to address. Pt denies food allergies or intolerance, states he is a vegetarian but consumed dairy products and at times will eat eggs. Pt admits to weight loss of 10-14 pound over the past three months, however is unsure as to the etiology. Does state that about 3 months ago his HgA1c was 8.0% and he was then put on Tresiba, also cut out sweets from his diet which may have contributed. In terms of DM management, pt admits to checking blood glucose only 2 x per week. States adherence to medication regimen. Denies hypoglycemia at home however episode noted in house. Pt's wife is primarily responsible for cooking at home and both pt and wife appear highly motivated to change diet/lifestyle and receptive diet education at this time.

## 2017-12-18 NOTE — PROGRESS NOTE ADULT - ASSESSMENT
67M with pmh of HTN, HLD, DM who presented to cardiologist office with complaints of exertional dyspnea and exertional CP.  He underwent nuclear stress test revealing severe anterolateral wall ischemia and was subsequently admitted to [facility] for CATH which showed diffuse multi vessel coronary disease.  Underwent C 2L on 12/16.    Post-operative course:    12/17: Transferred to step down

## 2017-12-18 NOTE — PROGRESS NOTE ADULT - PROBLEM SELECTOR PLAN 1
-Test BG ac and hs and 2am tonight!  Decrease Lantus dose to  30 units q hs with adjust as needed  Continue Lantus 10 ac meals since pt eating well.  -Continue Humalog correction scales ac and hs but change to low dose instead of moderate.  -RD consult to follow for diet education.  -Upon discharge will decide if pt can continue home regimen or basal/bolus insulin depending on insulin requirements in hospital.  -Plan discussed with pt/team.  Contact info: [phone] (24/7). pager [phone]

## 2017-12-18 NOTE — PROGRESS NOTE ADULT - SUBJECTIVE AND OBJECTIVE BOX
VITAL SIGNS  T(F): 97.4  HR: 76  BP: 141/78  RR: 18  SpO2: 100%      12-17-17 @ 07:01  -  12-18-17 @ 01:36  --------------------------------------------------------  OUT: 743 mL / NET: -743 mL    LAB      CAPILLARY BLOOD GLUCOSE    DIAGNOSTICS    MEDICATIONS  atorvastatin 80 milliGRAM(s) Oral at bedtime  docusate sodium 100 milliGRAM(s) Oral three times a day  insulin glargine Injectable (LANTUS) 34 Unit(s) SubCutaneous at bedtime  insulin Infusion 3 Unit(s)/Hr IV Continuous <Continuous>  insulin lispro (HumaLOG) corrective regimen sliding scale   SubCutaneous three times a day before meals  insulin lispro (HumaLOG) corrective regimen sliding scale   SubCutaneous at bedtime  insulin lispro Injectable (HumaLOG) 10 Unit(s) SubCutaneous three times a day before meals  metoprolol     tartrate 25 milliGRAM(s) Oral every 8 hours  polyethylene glycol 3350 17 Gram(s) Oral daily      PHYSICAL EXAM  GEN: No apparent distress, examined in   NEURO: Non-focal,  A+Ox 3  CV: S1 S2 without rub or murmur  MEDIASTINUM: Sternal incision covered with dressing, CDI, stable  PACING WIRES:   VVI [  ]  setting:      Isolated/Insulated [  ]  DRAINS:  Dominic [  ]  Drainage:         Pleural [  ]  Drainage:    PULMONARY:  CTA, Decreased left base   INCENTIVE SPIROMETRY:  ABDOMEN:  Soft, non-tender, non-distended, bowel sounds active x 4  LBM:  : voiding   VASCULAR: +pp +radial  SKIN: Warm, dry, intact   leg incision:  ACE [  ]  MUSCULOSKELETAL:  Moves all extremities equally  PHYSICAL THERAPY REC:  Home [  ]  Home w PT [   ]   YAHIR [   ]

## 2017-12-18 NOTE — DIETITIAN INITIAL EVALUATION ADULT. - ADHERENCE
n/a Pt denies following a modified diet PTA, however does state he has stayed away from concentrated sweets to promote glucose control./n/a

## 2017-12-18 NOTE — PROGRESS NOTE ADULT - ASSESSMENT
67 y/0 M w/h/o controlled T2DM per A1C. Also, HTN/HLD. Here with CP/SOB found to have  MVD now s/p CABG x2 on 12/16. Tolerating POs Glycemic control mostly at goal except for overnight BG reading of 68 with f/u BG 98. Pt reports no symptoms of hypoglycemia.. BG GOAL 100S TO 180S. Will preventively decrease lantus dose and check BG overnight. WBC up today.   Pt/wife would like more information about healthy eating and portion control. Will have RD f/u.

## 2017-12-18 NOTE — PROGRESS NOTE ADULT - SUBJECTIVE AND OBJECTIVE BOX
EP ATTENDING    tele: NSR, first degree AV delay, no significant events    no palpitations, no syncope, no angina    acetaminophen   Tablet. 650 milliGRAM(s) Oral every 6 hours PRN  aspirin enteric coated 325 milliGRAM(s) Oral daily  atorvastatin 80 milliGRAM(s) Oral at bedtime  dextrose 5%. 1000 milliLiter(s) IV Continuous <Continuous>  dextrose 50% Injectable 12.5 Gram(s) IV Push once  dextrose 50% Injectable 25 Gram(s) IV Push once  dextrose 50% Injectable 25 Gram(s) IV Push once  dextrose Gel 1 Dose(s) Oral once PRN  docusate sodium 100 milliGRAM(s) Oral three times a day  enoxaparin Injectable 40 milliGRAM(s) SubCutaneous daily  glucagon  Injectable 1 milliGRAM(s) IntraMuscular once PRN  insulin glargine Injectable (LANTUS) 34 Unit(s) SubCutaneous at bedtime  insulin Infusion 3 Unit(s)/Hr IV Continuous <Continuous>  insulin lispro (HumaLOG) corrective regimen sliding scale   SubCutaneous three times a day before meals  insulin lispro (HumaLOG) corrective regimen sliding scale   SubCutaneous at bedtime  insulin lispro Injectable (HumaLOG) 10 Unit(s) SubCutaneous three times a day before meals  metoprolol     tartrate 25 milliGRAM(s) Oral every 8 hours  oxyCODONE    IR 5 milliGRAM(s) Oral every 4 hours PRN  polyethylene glycol 3350 17 Gram(s) Oral daily  sodium chloride 0.9%. 1000 milliLiter(s) IV Continuous <Continuous>                            11.8   15.2  )-----------( 192      ( 18 Dec 2017 03:16 )             35.7       12-18    136  |  101  |  23  ----------------------------<  96  4.3   |  23  |  0.71    Ca    8.5      18 Dec 2017 03:16    TPro  6.3  /  Alb  3.7  /  TBili  0.5  /  DBili  x   /  AST  21  /  ALT  21  /  AlkPhos  33<L>  12-18      CARDIAC MARKERS ( 16 Dec 2017 12:37 )  x     / 0.26 ng/mL / 190 U/L / x     / 13.0 ng/mL  CARDIAC MARKERS ( 15 Dec 2017 23:36 )  x     / 0.08 ng/mL / 134 U/L / x     / 2.4 ng/mL  CARDIAC MARKERS ( 15 Dec 2017 16:10 )  x     / 0.06 ng/mL / 161 u/L / 3.17 ng/mL / x            T(C): 36.7 (12-18-17 @ 07:04), Max: 37.1 (12-17-17 @ 11:00)  HR: 125 (12-18-17 @ 07:04) (75 - 125)  BP: 125/69 (12-18-17 @ 07:04) (109/58 - 152/70)  RR: 18 (12-18-17 @ 07:04) (17 - 33)  SpO2: 99% (12-18-17 @ 07:04) (95% - 100%)  Wt(kg): --    no JVD  RRR, no murmurs  CTAB  soft nt/nd  no c/c/e    office echo: normal LVEF      A/P) 67 year old male PMH HTN, HLD, DM admitted with dyspnea on exertion. Found to have LM disease now s/p CABG. EP called for bifascicular block. No syncope, presyncope, nor heart block     -given the above there is no indication for PPM at this time  -no contraindication to beta blockers for CAD  -supportive care as per CTS  -f/u with Kris after discharge

## 2017-12-19 DIAGNOSIS — J93.9 PNEUMOTHORAX, UNSPECIFIED: ICD-10-CM

## 2017-12-19 PROBLEM — Z00.00 ENCOUNTER FOR PREVENTIVE HEALTH EXAMINATION: Status: ACTIVE | Noted: 2017-12-19

## 2017-12-19 LAB
ALBUMIN SERPL ELPH-MCNC: 3.6 G/DL — SIGNIFICANT CHANGE UP (ref 3.3–5)
ALP SERPL-CCNC: 40 U/L — SIGNIFICANT CHANGE UP (ref 40–120)
ALT FLD-CCNC: 42 U/L RC — SIGNIFICANT CHANGE UP (ref 10–45)
ANION GAP SERPL CALC-SCNC: 11 MMOL/L — SIGNIFICANT CHANGE UP (ref 5–17)
AST SERPL-CCNC: 29 U/L — SIGNIFICANT CHANGE UP (ref 10–40)
BILIRUB SERPL-MCNC: 0.7 MG/DL — SIGNIFICANT CHANGE UP (ref 0.2–1.2)
BUN SERPL-MCNC: 18 MG/DL — SIGNIFICANT CHANGE UP (ref 7–23)
CALCIUM SERPL-MCNC: 8.5 MG/DL — SIGNIFICANT CHANGE UP (ref 8.4–10.5)
CHLORIDE SERPL-SCNC: 99 MMOL/L — SIGNIFICANT CHANGE UP (ref 96–108)
CO2 SERPL-SCNC: 25 MMOL/L — SIGNIFICANT CHANGE UP (ref 22–31)
CREAT SERPL-MCNC: 0.65 MG/DL — SIGNIFICANT CHANGE UP (ref 0.5–1.3)
GLUCOSE BLDC GLUCOMTR-MCNC: 105 MG/DL — HIGH (ref 70–99)
GLUCOSE BLDC GLUCOMTR-MCNC: 105 MG/DL — HIGH (ref 70–99)
GLUCOSE BLDC GLUCOMTR-MCNC: 109 MG/DL — HIGH (ref 70–99)
GLUCOSE BLDC GLUCOMTR-MCNC: 115 MG/DL — HIGH (ref 70–99)
GLUCOSE BLDC GLUCOMTR-MCNC: 98 MG/DL — SIGNIFICANT CHANGE UP (ref 70–99)
GLUCOSE SERPL-MCNC: 94 MG/DL — SIGNIFICANT CHANGE UP (ref 70–99)
HCT VFR BLD CALC: 37.2 % — LOW (ref 39–50)
HGB BLD-MCNC: 12.8 G/DL — LOW (ref 13–17)
MCHC RBC-ENTMCNC: 31.5 PG — SIGNIFICANT CHANGE UP (ref 27–34)
MCHC RBC-ENTMCNC: 34.4 GM/DL — SIGNIFICANT CHANGE UP (ref 32–36)
MCV RBC AUTO: 91.6 FL — SIGNIFICANT CHANGE UP (ref 80–100)
PLATELET # BLD AUTO: 198 K/UL — SIGNIFICANT CHANGE UP (ref 150–400)
POTASSIUM SERPL-MCNC: 3.9 MMOL/L — SIGNIFICANT CHANGE UP (ref 3.5–5.3)
POTASSIUM SERPL-SCNC: 3.9 MMOL/L — SIGNIFICANT CHANGE UP (ref 3.5–5.3)
PROT SERPL-MCNC: 6.4 G/DL — SIGNIFICANT CHANGE UP (ref 6–8.3)
RBC # BLD: 4.06 M/UL — LOW (ref 4.2–5.8)
RBC # FLD: 12.4 % — SIGNIFICANT CHANGE UP (ref 10.3–14.5)
SODIUM SERPL-SCNC: 135 MMOL/L — SIGNIFICANT CHANGE UP (ref 135–145)
WBC # BLD: 10.8 K/UL — HIGH (ref 3.8–10.5)
WBC # FLD AUTO: 10.8 K/UL — HIGH (ref 3.8–10.5)

## 2017-12-19 PROCEDURE — 71010: CPT | Mod: 26,77

## 2017-12-19 PROCEDURE — 71010: CPT | Mod: 26

## 2017-12-19 PROCEDURE — 99232 SBSQ HOSP IP/OBS MODERATE 35: CPT

## 2017-12-19 RX ORDER — ASPIRIN/CALCIUM CARB/MAGNESIUM 324 MG
1 TABLET ORAL
Qty: 0 | Refills: 0 | COMMUNITY

## 2017-12-19 RX ORDER — INSULIN GLARGINE 100 [IU]/ML
25 INJECTION, SOLUTION SUBCUTANEOUS AT BEDTIME
Qty: 0 | Refills: 0 | Status: DISCONTINUED | OUTPATIENT
Start: 2017-12-19 | End: 2017-12-20

## 2017-12-19 RX ADMIN — POLYETHYLENE GLYCOL 3350 17 GRAM(S): 17 POWDER, FOR SOLUTION ORAL at 12:12

## 2017-12-19 RX ADMIN — Medication 100 MILLIGRAM(S): at 13:30

## 2017-12-19 RX ADMIN — Medication 325 MILLIGRAM(S): at 12:12

## 2017-12-19 RX ADMIN — Medication 100 MILLIGRAM(S): at 23:59

## 2017-12-19 RX ADMIN — Medication 50 MILLIGRAM(S): at 05:36

## 2017-12-19 RX ADMIN — Medication 50 MILLIGRAM(S): at 17:53

## 2017-12-19 RX ADMIN — INSULIN GLARGINE 25 UNIT(S): 100 INJECTION, SOLUTION SUBCUTANEOUS at 23:59

## 2017-12-19 RX ADMIN — Medication 10 UNIT(S): at 08:07

## 2017-12-19 RX ADMIN — ENOXAPARIN SODIUM 40 MILLIGRAM(S): 100 INJECTION SUBCUTANEOUS at 12:12

## 2017-12-19 RX ADMIN — Medication 10 UNIT(S): at 12:10

## 2017-12-19 RX ADMIN — Medication 100 MILLIGRAM(S): at 05:36

## 2017-12-19 RX ADMIN — Medication 0: at 23:58

## 2017-12-19 RX ADMIN — Medication 10 UNIT(S): at 16:58

## 2017-12-19 NOTE — PROGRESS NOTE ADULT - ASSESSMENT
67 y/0 M w/h/o controlled T2DM per A1C. Also, HTN/HLD. Here with CP/SOB found to have  MVD now s/p CABG x2 on 12/16. Tolerating POs Glycemic control at goal except for overnight BG reading <100s. Pt reports no symptoms of hypoglycemia. Will decrease Lantus dose further more and check BG overnight. WBC improved.

## 2017-12-19 NOTE — PROGRESS NOTE ADULT - SUBJECTIVE AND OBJECTIVE BOX
EP ATTENDING    tele: NSR, first degree AV delay, no significant events    no palpitations, no syncope, no angina    acetaminophen   Tablet. 650 milliGRAM(s) Oral every 6 hours PRN  aspirin enteric coated 325 milliGRAM(s) Oral daily  atorvastatin 80 milliGRAM(s) Oral at bedtime  dextrose 5%. 1000 milliLiter(s) IV Continuous <Continuous>  dextrose 50% Injectable 12.5 Gram(s) IV Push once  dextrose 50% Injectable 25 Gram(s) IV Push once  dextrose 50% Injectable 25 Gram(s) IV Push once  dextrose Gel 1 Dose(s) Oral once PRN  docusate sodium 100 milliGRAM(s) Oral three times a day  enoxaparin Injectable 40 milliGRAM(s) SubCutaneous daily  glucagon  Injectable 1 milliGRAM(s) IntraMuscular once PRN  insulin glargine Injectable (LANTUS) 30 Unit(s) SubCutaneous at bedtime  insulin Infusion 3 Unit(s)/Hr IV Continuous <Continuous>  insulin lispro (HumaLOG) corrective regimen sliding scale   SubCutaneous three times a day before meals  insulin lispro (HumaLOG) corrective regimen sliding scale   SubCutaneous at bedtime  insulin lispro Injectable (HumaLOG) 10 Unit(s) SubCutaneous three times a day before meals  metoprolol     tartrate 50 milliGRAM(s) Oral two times a day  oxyCODONE    IR 5 milliGRAM(s) Oral every 4 hours PRN  polyethylene glycol 3350 17 Gram(s) Oral daily  sodium chloride 0.9%. 1000 milliLiter(s) IV Continuous <Continuous>                            12.8   10.8  )-----------( 198      ( 19 Dec 2017 06:10 )             37.2       12-19    135  |  99  |  18  ----------------------------<  94  3.9   |  25  |  0.65    Ca    8.5      19 Dec 2017 06:10    TPro  6.4  /  Alb  3.6  /  TBili  0.7  /  DBili  x   /  AST  29  /  ALT  42  /  AlkPhos  40  12-19      CARDIAC MARKERS ( 16 Dec 2017 12:37 )  x     / 0.26 ng/mL / 190 U/L / x     / 13.0 ng/mL        T(C): 36.8 (12-19-17 @ 05:10), Max: 36.8 (12-18-17 @ 20:36)  HR: 85 (12-19-17 @ 05:10) (75 - 93)  BP: 125/76 (12-19-17 @ 05:10) (119/66 - 137/68)  RR: 18 (12-19-17 @ 05:10) (18 - 18)  SpO2: 94% (12-19-17 @ 05:10) (93% - 94%)  Wt(kg): --    no JVD  RRR, no murmurs  CTAB  soft nt/nd  no c/c/e    office echo: normal LVEF      A/P) 67 year old male PMH HTN, HLD, DM admitted with dyspnea on exertion. Found to have LM disease now s/p CABG. EP called for bifascicular block. No syncope, presyncope, nor heart block     -given the above there is no indication for PPM at this time  -no contraindication to beta blockers for CAD  -supportive care as per CTS  -f/u with Kris after discharge

## 2017-12-19 NOTE — PROGRESS NOTE ADULT - PROBLEM SELECTOR PLAN 2
d/c Pw's   statin, BB, asa  follow up AM labs  Keep K>4.0  Mg>2.0  progressive ambulation  pulmonary toileting/incentive spirometry  pain management

## 2017-12-19 NOTE — PROGRESS NOTE ADULT - SUBJECTIVE AND OBJECTIVE BOX
Subjective: pt seen and examined, no complaints on exam.     acetaminophen   Tablet. 650 milliGRAM(s) Oral every 6 hours PRN  aspirin enteric coated 325 milliGRAM(s) Oral daily  atorvastatin 80 milliGRAM(s) Oral at bedtime  dextrose 5%. 1000 milliLiter(s) IV Continuous <Continuous>  dextrose 50% Injectable 12.5 Gram(s) IV Push once  dextrose 50% Injectable 25 Gram(s) IV Push once  dextrose 50% Injectable 25 Gram(s) IV Push once  dextrose Gel 1 Dose(s) Oral once PRN  docusate sodium 100 milliGRAM(s) Oral three times a day  enoxaparin Injectable 40 milliGRAM(s) SubCutaneous daily  glucagon  Injectable 1 milliGRAM(s) IntraMuscular once PRN  insulin glargine Injectable (LANTUS) 30 Unit(s) SubCutaneous at bedtime  insulin Infusion 3 Unit(s)/Hr IV Continuous <Continuous>  insulin lispro (HumaLOG) corrective regimen sliding scale   SubCutaneous three times a day before meals  insulin lispro (HumaLOG) corrective regimen sliding scale   SubCutaneous at bedtime  insulin lispro Injectable (HumaLOG) 10 Unit(s) SubCutaneous three times a day before meals  metoprolol     tartrate 50 milliGRAM(s) Oral two times a day  oxyCODONE    IR 5 milliGRAM(s) Oral every 4 hours PRN  polyethylene glycol 3350 17 Gram(s) Oral daily  sodium chloride 0.9%. 1000 milliLiter(s) IV Continuous <Continuous>                            11.8   15.2  )-----------( 192      ( 18 Dec 2017 03:16 )             35.7       Hemoglobin: 11.8 g/dL (12-18 @ 03:16)  Hemoglobin: 12.1 g/dL (12-17 @ 01:50)  Hemoglobin: 12.1 g/dL (12-16 @ 18:47)  Hemoglobin: 12.0 g/dL (12-16 @ 12:37)  Hemoglobin: 14.5 g/dL (12-15 @ 23:36)      12-18    136  |  101  |  23  ----------------------------<  96  4.3   |  23  |  0.71    Ca    8.5      18 Dec 2017 03:16    TPro  6.3  /  Alb  3.7  /  TBili  0.5  /  DBili  x   /  AST  21  /  ALT  21  /  AlkPhos  33<L>  12-18    Creatinine Trend: 0.71<--, 0.75<--, 0.78<--, 0.79<--, 0.89<--    COAGS:     CARDIAC MARKERS ( 16 Dec 2017 12:37 )  x     / 0.26 ng/mL / 190 U/L / x     / 13.0 ng/mL        T(C): 36.8 (12-18-17 @ 20:36), Max: 36.8 (12-18-17 @ 20:36)  HR: 75 (12-18-17 @ 20:36) (75 - 125)  BP: 119/66 (12-18-17 @ 20:36) (115/65 - 137/68)  RR: 18 (12-18-17 @ 20:36) (18 - 18)  SpO2: 94% (12-18-17 @ 20:36) (93% - 100%)  Wt(kg): --    I&O's Summary    17 Dec 2017 07:01  -  18 Dec 2017 07:00  --------------------------------------------------------  IN: 1283 mL / OUT: 1343 mL / NET: -60 mL    18 Dec 2017 07:01  -  19 Dec 2017 05:05  --------------------------------------------------------  IN: 600 mL / OUT: 783 mL / NET: -183 mL        Cardiovascular: S1S2 RRR  Respiratory: Lungs clear to auscultation, normal effort 	  Gastrointestinal:  Soft, Non-tender, + BS	  Skin: No rashes,  No cyanosis, warm to touch  Ext: no C/C/E B/L LE    TELEMETRY: 	SR      ASSESSMENT/PLAN: 	67 yr old Male with PMH HTN, HLD, DM who presented to our office with exertional chest pain, underwent NST revealing anterolateral ischemia and s/p cardiac cath 12/15 which revealed LM disease.  Now s/p CABG x 2 on 12/16/2017  (LIMA to LAD, SVG to OM), intra-op KAE with normal LV function    --on asa, statin, Metoprolol  --  GI / DVT prophylaxis.  --  keep K>4, mag >2.0   --PT/Pain management/incentive spirometry  -- pt is euvolemic . monitor creatine   D/W Dr Ponce

## 2017-12-19 NOTE — PROGRESS NOTE ADULT - ASSESSMENT
67M with pmh of HTN, HLD, DM who presented to cardiologist office with complaints of exertional dyspnea and exertional CP.  He underwent nuclear stress test revealing severe anterolateral wall ischemia and was subsequently admitted to [facility] for CATH which showed diffuse multi vessel coronary disease.    Underwent C 2L on 12/16.    Post-operative course:    12/17: Transferred to step down  D/c chest tube today  AmbulateBeta blockers  ok to transfer to floor 12/18 12/19/17 - CXR this am shows small right apical ptx - patient asymptomatic - O2 3L n/c placed on pt.  will ck CXR later- Lantus dose adjusted - per house endo  d/c pw's as per dR. Cuellar  d/c plan home in am if ptx stable 12/20 67M with pmh of HTN, HLD, DM who presented to cardiologist office with complaints of exertional dyspnea and exertional CP.  He underwent nuclear stress test revealing severe anterolateral wall ischemia and was subsequently admitted to [facility] for CATH which showed diffuse multi vessel coronary disease.    Underwent C 2L on 12/16.    Post-operative course:  12/17: Transferred to step down  D/c chest tube today  AmbulateBeta blockers  ok to transfer to floor 12/18 12/19/17 - CXR this am shows small right apical ptx - patient asymptomatic - O2 3L n/c placed on pt.  will ck CXR later- Lantus dose adjusted - per house endo  d/c pw's as per dR. Cuellar  d/c plan home in am if ptx stable 12/20

## 2017-12-19 NOTE — PROGRESS NOTE ADULT - SUBJECTIVE AND OBJECTIVE BOX
DIABETES FOLLOW UP NOTE: Saw pt earlier today  INTERVAL HX: 67 y/0 M w/h/o controlled T2DM on Tresiba plus Oral DM meds. Also, HTN/HLD. Here with CP/SOB found to have MVD now S/P CABGx2 ON 12/16. Reports eating well. Glycemic control mostly at goal except for overnight BG <100s again. BG GOAL 100S TO 180S.    Review of Systems: "I'm ok"  Cardiovascular: No chest pain, palpitations  Respiratory: No SOB, no cough  GI: No nausea, vomiting, abdominal pain  Endocrine: no polyuria, polydipsia or S&Sx of hypoglycemia    Allergies    No Known Allergies    Intolerances      MEDICATIONS :  atorvastatin 80 milliGRAM(s) Oral at bedtime  insulin glargine Injectable (LANTUS) 30 Unit(s) SubCutaneous at bedtime  insulin lispro (HumaLOG) corrective regimen sliding scale   SubCutaneous three times a day before meals  insulin lispro (HumaLOG) corrective regimen sliding scale   SubCutaneous at bedtime  insulin lispro Injectable (HumaLOG) 10 Unit(s) SubCutaneous three times a day before meals      PHYSICAL EXAM:  Vital Signs Last 24 Hrs  T(C): 37.2 (12-19-17 @ 13:00), Max: 37.2 (12-19-17 @ 13:00)  T(F): 99 (12-19-17 @ 13:00), Max: 99 (12-19-17 @ 13:00)  HR: 86 (12-19-17 @ 13:00) (75 - 86)  BP: 128/73 (12-19-17 @ 13:00) (119/66 - 128/73)  BP(mean): --  RR: 18 (12-19-17 @ 13:00) (18 - 18)  SpO2: 99% (12-19-17 @ 13:00) (94% - 99%)  GENERAL: Male lying in bed in NAD.   Chest: Sterna incision D&I.  Abdomen: Soft, nontender, non distended, central adiposity  Extremities: Warm, No edema in all 4 exts.  LLE graft donor site D&I  NEURO: A&O x3    LABS:  POCT Blood Glucose.: 115 mg/dL (12-19-17 @ 12:04)  POCT Blood Glucose.: 105 mg/dL (12-19-17 @ 07:10)  POCT Blood Glucose.: 98 mg/dL (12-19-17 @ 02:11)  POCT Blood Glucose.: 76 mg/dL (12-18-17 @ 21:59)  POCT Blood Glucose.: 112 mg/dL (12-18-17 @ 16:27)  POCT Blood Glucose.: 135 mg/dL (12-18-17 @ 11:56)  POCT Blood Glucose.: 112 mg/dL (12-18-17 @ 07:10)  POCT Blood Glucose.: 100 mg/dL (12-18-17 @ 03:35)  POCT Blood Glucose.: 99 mg/dL (12-18-17 @ 03:11)  POCT Blood Glucose.: 98 mg/dL (12-18-17 @ 02:42)  POCT Blood Glucose.: 68 mg/dL (12-18-17 @ 02:41)  POCT Blood Glucose.: 99 mg/dL (12-18-17 @ 02:12)  POCT Blood Glucose.: 87 mg/dL (12-18-17 @ 01:39)  POCT Blood Glucose.: 97 mg/dL (12-18-17 @ 01:04)  POCT Blood Glucose.: 87 mg/dL (12-18-17 @ 00:25)                          12.8   10.8  )-----------( 198      ( 19 Dec 2017 06:10 )             37.2                             11.8   15.2  )-----------( 192      ( 18 Dec 2017 03:16 )             35.7       12-19    135  |  99  |  18  ----------------------------<  94  3.9   |  25  |  0.65    Ca    8.5      19 Dec 2017 06:10    TPro  6.4  /  Alb  3.6  /  TBili  0.7  /  DBili  x   /  AST  29  /  ALT  42  /  AlkPhos  40  12-19 12-18    136  |  101  |  23  ----------------------------<  96  4.3   |  23  |  0.71    EGFR if : 113  EGFR if non : 97    Ca    8.5      12-18    TPro  6.3  /  Alb  3.7  /  TBili  0.5  /  DBili  x   /  AST  21  /  ALT  21  /  AlkPhos  33<L>  12-18      Thyroid Function Tests:  12-16 @ 02:23 TSH 3.15 FreeT4 -- T3 83 Anti TPO -- Anti Thyroglobulin Ab -- TSI --      Hemoglobin A1C, Whole Blood: 7.2 % <H> [4.0 - 5.6] (12-16-17 @ 02:23)      12-16 Chol 190  HDL 36<L> Trig 236<H>

## 2017-12-19 NOTE — PROGRESS NOTE ADULT - PROBLEM SELECTOR PLAN 1
small right apical pneumothorax on CXR 5a this am  CT removed yesterday  [id number]/[id number]  ck CXR later & in am

## 2017-12-19 NOTE — PROGRESS NOTE ADULT - PROBLEM SELECTOR PLAN 1
-Test BG ac and hs and 2am tonight!  Decrease Lantus dose to 25 units q hs will adjust as needed  Continue Lantus 10 ac meals since pt eating well.  -Continue Humalog correction scales ac and hs low dose  -Awaiting RD consult to follow for diet education.  -Upon discharge will likely restart pre admission regimen since pt's renal function is stable and insulin requirements in hospital continue to come down.  -Plan discussed with pt/team.  Contact info: [phone] (24/7). pager [phone]

## 2017-12-19 NOTE — PROGRESS NOTE ADULT - SUBJECTIVE AND OBJECTIVE BOX
S: no chest pain or sob       acetaminophen   Tablet. 650 milliGRAM(s) Oral every 6 hours PRN  aspirin enteric coated 325 milliGRAM(s) Oral daily  atorvastatin 80 milliGRAM(s) Oral at bedtime  dextrose 5%. 1000 milliLiter(s) IV Continuous <Continuous>  dextrose 50% Injectable 12.5 Gram(s) IV Push once  dextrose 50% Injectable 25 Gram(s) IV Push once  dextrose 50% Injectable 25 Gram(s) IV Push once  dextrose Gel 1 Dose(s) Oral once PRN  docusate sodium 100 milliGRAM(s) Oral three times a day  enoxaparin Injectable 40 milliGRAM(s) SubCutaneous daily  glucagon  Injectable 1 milliGRAM(s) IntraMuscular once PRN  insulin glargine Injectable (LANTUS) 30 Unit(s) SubCutaneous at bedtime  insulin Infusion 3 Unit(s)/Hr IV Continuous <Continuous>  insulin lispro (HumaLOG) corrective regimen sliding scale   SubCutaneous three times a day before meals  insulin lispro (HumaLOG) corrective regimen sliding scale   SubCutaneous at bedtime  insulin lispro Injectable (HumaLOG) 10 Unit(s) SubCutaneous three times a day before meals  metoprolol     tartrate 50 milliGRAM(s) Oral two times a day  oxyCODONE    IR 5 milliGRAM(s) Oral every 4 hours PRN  polyethylene glycol 3350 17 Gram(s) Oral daily  sodium chloride 0.9%. 1000 milliLiter(s) IV Continuous <Continuous>                            12.8   10.8  )-----------( 198      ( 19 Dec 2017 06:10 )             37.2       12-19    135  |  99  |  18  ----------------------------<  94  3.9   |  25  |  0.65    Ca    8.5      19 Dec 2017 06:10    TPro  6.4  /  Alb  3.6  /  TBili  0.7  /  DBili  x   /  AST  29  /  ALT  42  /  AlkPhos  40  12-19            T(C): 36.8 (12-19-17 @ 05:10), Max: 36.8 (12-18-17 @ 20:36)  HR: 85 (12-19-17 @ 05:10) (75 - 85)  BP: 125/76 (12-19-17 @ 05:10) (119/66 - 125/76)  RR: 18 (12-19-17 @ 05:10) (18 - 18)  SpO2: 94% (12-19-17 @ 05:10) (94% - 94%)  Wt(kg): --    I&O's Summary    18 Dec 2017 07:01  -  19 Dec 2017 07:00  --------------------------------------------------------  IN: 600 mL / OUT: 783 mL / NET: -183 mL          HEENT:   Normal oral mucosa, PERRL, EOMI	  Lymphatic: No lymphadenopathy , no edema  Cardiovascular: Normal S1 S2, No JVD, No murmurs , Peripheral pulses palpable 2+ bilaterally  Respiratory: Lungs clear to auscultation, normal effort 	  Gastrointestinal:  Soft, Non-tender, + BS	      TELEMETRY: 	nsr      DIAGNOSTIC TESTING:  [ ] Echocardiogram:  intra op - KAE- NL lv fx  [ ]  Catheterization: diffuse multi vessel disease   OTHER: 	        ASSESSMENT/PLAN: 	67y Male hx of dm, htn, chol, obesity admitted with new onset angina found to have severe LM disease now s/p CABG X 2  -Progressing well  -continue with asa/statin/beta blockers  -f/u cts    Sheron Christopher MD  [facility]  [address] [place] [address], [id number]  [place] [id number]  office: [phone]  pager: [phone]

## 2017-12-19 NOTE — PROGRESS NOTE ADULT - SUBJECTIVE AND OBJECTIVE BOX
VITAL SIGNS-Tele: Sr 70-90    Vital Signs Last 24 Hrs  T(C): 36.8 (17 @ 05:10), Max: 36.8 (17 @ 20:36)  BP: 125/76 (17 @ 05:10) (119/66 - 125/76)  SpO2: 94% (17 @ 05:10) (94% - 94%)          @ 07:01  -   @ 07:00  --------------------------------------------------------  IN: 600 mL / OUT: 783 mL / NET: -183 mL    Daily     Daily Weight in k (19 Dec 2017 10:00)    CAPILLARY BLOOD GLUCOSE  POCT Blood Glucose.: 105 mg/dL (19 Dec 2017 07:10)  POCT Blood Glucose.: 98 mg/dL (19 Dec 2017 02:11)  POCT Blood Glucose.: 76 mg/dL (18 Dec 2017 21:59)  POCT Blood Glucose.: 112 mg/dL (18 Dec 2017 16:27)    PHYSICAL EXAM:  Neurology: alert and oriented x 3, nonfocal, no gross deficits  CV : S1S2  Sternal Wound :  CDI , Stable  Lungs:  Abdomen: soft, nontender, nondistended, positive bowel sounds, last bowel movement         Extremities:         acetaminophen   Tablet. 650 milliGRAM(s) Oral every 6 hours PRN  aspirin enteric coated 325 milliGRAM(s) Oral daily  atorvastatin 80 milliGRAM(s) Oral at bedtime  docusate sodium 100 milliGRAM(s) Oral three times a day  enoxaparin Injectable 40 milliGRAM(s) SubCutaneous daily  glucagon  Injectable 1 milliGRAM(s) IntraMuscular once PRN  insulin glargine Injectable (LANTUS) 30 Unit(s) SubCutaneous at bedtime  insulin lispro Injectable (HumaLOG) 10 Unit(s) SubCutaneous three times a day before meals  metoprolol     tartrate 50 milliGRAM(s) Oral two times a day  oxyCODONE    IR 5 milliGRAM(s) Oral every 4 hours PRN  polyethylene glycol 3350 17 Gram(s) Oral daily    Physical Therapy Rec:   Home  [  ]   Home w/ PT  [x  ]  Rehab  [  ]  Discussed with Cardiothoracic Team at AM rounds. VITAL SIGNS-Tele: Sr 70-90    Vital Signs Last 24 Hrs  T(C): 36.8 (17 @ 05:10), Max: 36.8 (17 @ 20:36)  BP: 125/76 (17 @ 05:10) (119/66 - 125/76)  SpO2: 94% (17 @ 05:10) (94% - 94%)          @ 07:01  -   @ 07:00  --------------------------------------------------------  IN: 600 mL / OUT: 783 mL / NET: -183 mL    Daily     Daily Weight in k (19 Dec 2017 10:00)    CAPILLARY BLOOD GLUCOSE  POCT Blood Glucose.: 105 mg/dL (19 Dec 2017 07:10)  POCT Blood Glucose.: 98 mg/dL (19 Dec 2017 02:11)  POCT Blood Glucose.: 76 mg/dL (18 Dec 2017 21:59)  POCT Blood Glucose.: 112 mg/dL (18 Dec 2017 16:27)    PHYSICAL EXAM:  Neurology: alert and oriented x 3, nonfocal, no gross deficits  CV : S1S2  Sternal Wound :  CDI , Stable  Lungs: diminished BS R base  Abdomen: soft, nontender, nondistended, positive bowel sounds, last bowel movement         Extremities:         acetaminophen   Tablet. 650 milliGRAM(s) Oral every 6 hours PRN  aspirin enteric coated 325 milliGRAM(s) Oral daily  atorvastatin 80 milliGRAM(s) Oral at bedtime  docusate sodium 100 milliGRAM(s) Oral three times a day  enoxaparin Injectable 40 milliGRAM(s) SubCutaneous daily  insulin glargine Injectable (LANTUS) 30 Unit(s) SubCutaneous at bedtime  insulin lispro Injectable (HumaLOG) 10 Unit(s) SubCutaneous three times a day before meals  metoprolol     tartrate 50 milliGRAM(s) Oral two times a day  oxyCODONE    IR 5 milliGRAM(s) Oral every 4 hours PRN  polyethylene glycol 3350 17 Gram(s) Oral daily    Physical Therapy Rec:   Home  [  ]   Home w/ PT  [x  ]  Rehab  [  ]  Discussed with Cardiothoracic Team at AM rounds.

## 2017-12-20 LAB
ALBUMIN SERPL ELPH-MCNC: 3.3 G/DL — SIGNIFICANT CHANGE UP (ref 3.3–5)
ALP SERPL-CCNC: 41 U/L — SIGNIFICANT CHANGE UP (ref 40–120)
ALT FLD-CCNC: 49 U/L RC — HIGH (ref 10–45)
ANION GAP SERPL CALC-SCNC: 12 MMOL/L — SIGNIFICANT CHANGE UP (ref 5–17)
AST SERPL-CCNC: 30 U/L — SIGNIFICANT CHANGE UP (ref 10–40)
BILIRUB SERPL-MCNC: 0.7 MG/DL — SIGNIFICANT CHANGE UP (ref 0.2–1.2)
BUN SERPL-MCNC: 19 MG/DL — SIGNIFICANT CHANGE UP (ref 7–23)
CALCIUM SERPL-MCNC: 8.6 MG/DL — SIGNIFICANT CHANGE UP (ref 8.4–10.5)
CHLORIDE SERPL-SCNC: 102 MMOL/L — SIGNIFICANT CHANGE UP (ref 96–108)
CO2 SERPL-SCNC: 23 MMOL/L — SIGNIFICANT CHANGE UP (ref 22–31)
CREAT SERPL-MCNC: 0.69 MG/DL — SIGNIFICANT CHANGE UP (ref 0.5–1.3)
GLUCOSE BLDC GLUCOMTR-MCNC: 102 MG/DL — HIGH (ref 70–99)
GLUCOSE BLDC GLUCOMTR-MCNC: 122 MG/DL — HIGH (ref 70–99)
GLUCOSE BLDC GLUCOMTR-MCNC: 183 MG/DL — HIGH (ref 70–99)
GLUCOSE BLDC GLUCOMTR-MCNC: 75 MG/DL — SIGNIFICANT CHANGE UP (ref 70–99)
GLUCOSE BLDC GLUCOMTR-MCNC: 78 MG/DL — SIGNIFICANT CHANGE UP (ref 70–99)
GLUCOSE SERPL-MCNC: 126 MG/DL — HIGH (ref 70–99)
HCT VFR BLD CALC: 37.9 % — LOW (ref 39–50)
HGB BLD-MCNC: 12.8 G/DL — LOW (ref 13–17)
MCHC RBC-ENTMCNC: 30.9 PG — SIGNIFICANT CHANGE UP (ref 27–34)
MCHC RBC-ENTMCNC: 33.8 GM/DL — SIGNIFICANT CHANGE UP (ref 32–36)
MCV RBC AUTO: 91.6 FL — SIGNIFICANT CHANGE UP (ref 80–100)
PLATELET # BLD AUTO: 206 K/UL — SIGNIFICANT CHANGE UP (ref 150–400)
POTASSIUM SERPL-MCNC: 3.7 MMOL/L — SIGNIFICANT CHANGE UP (ref 3.5–5.3)
POTASSIUM SERPL-SCNC: 3.7 MMOL/L — SIGNIFICANT CHANGE UP (ref 3.5–5.3)
PROT SERPL-MCNC: 5.9 G/DL — LOW (ref 6–8.3)
RBC # BLD: 4.13 M/UL — LOW (ref 4.2–5.8)
RBC # FLD: 12.2 % — SIGNIFICANT CHANGE UP (ref 10.3–14.5)
SODIUM SERPL-SCNC: 137 MMOL/L — SIGNIFICANT CHANGE UP (ref 135–145)
WBC # BLD: 8.7 K/UL — SIGNIFICANT CHANGE UP (ref 3.8–10.5)
WBC # FLD AUTO: 8.7 K/UL — SIGNIFICANT CHANGE UP (ref 3.8–10.5)

## 2017-12-20 PROCEDURE — 71020: CPT | Mod: 26

## 2017-12-20 PROCEDURE — 99232 SBSQ HOSP IP/OBS MODERATE 35: CPT

## 2017-12-20 RX ORDER — INSULIN LISPRO 100/ML
8 VIAL (ML) SUBCUTANEOUS
Qty: 0 | Refills: 0 | Status: DISCONTINUED | OUTPATIENT
Start: 2017-12-20 | End: 2017-12-21

## 2017-12-20 RX ORDER — POTASSIUM CHLORIDE 20 MEQ
40 PACKET (EA) ORAL ONCE
Qty: 0 | Refills: 0 | Status: COMPLETED | OUTPATIENT
Start: 2017-12-20 | End: 2017-12-20

## 2017-12-20 RX ORDER — METOPROLOL TARTRATE 50 MG
50 TABLET ORAL
Qty: 0 | Refills: 0 | Status: DISCONTINUED | OUTPATIENT
Start: 2017-12-20 | End: 2017-12-21

## 2017-12-20 RX ORDER — INSULIN GLARGINE 100 [IU]/ML
20 INJECTION, SOLUTION SUBCUTANEOUS AT BEDTIME
Qty: 0 | Refills: 0 | Status: DISCONTINUED | OUTPATIENT
Start: 2017-12-20 | End: 2017-12-21

## 2017-12-20 RX ORDER — METOPROLOL TARTRATE 50 MG
50 TABLET ORAL ONCE
Qty: 0 | Refills: 0 | Status: DISCONTINUED | OUTPATIENT
Start: 2017-12-20 | End: 2017-12-20

## 2017-12-20 RX ORDER — SORBITOL SOLUTION 70 %
30 SOLUTION, ORAL MISCELLANEOUS ONCE
Qty: 0 | Refills: 0 | Status: COMPLETED | OUTPATIENT
Start: 2017-12-20 | End: 2017-12-20

## 2017-12-20 RX ADMIN — ENOXAPARIN SODIUM 40 MILLIGRAM(S): 100 INJECTION SUBCUTANEOUS at 11:22

## 2017-12-20 RX ADMIN — Medication 50 MILLIGRAM(S): at 05:27

## 2017-12-20 RX ADMIN — Medication 325 MILLIGRAM(S): at 11:21

## 2017-12-20 RX ADMIN — Medication: at 11:26

## 2017-12-20 RX ADMIN — POLYETHYLENE GLYCOL 3350 17 GRAM(S): 17 POWDER, FOR SOLUTION ORAL at 11:21

## 2017-12-20 RX ADMIN — Medication 50 MILLIGRAM(S): at 17:41

## 2017-12-20 RX ADMIN — ATORVASTATIN CALCIUM 80 MILLIGRAM(S): 80 TABLET, FILM COATED ORAL at 21:15

## 2017-12-20 RX ADMIN — Medication 100 MILLIGRAM(S): at 21:15

## 2017-12-20 RX ADMIN — Medication 100 MILLIGRAM(S): at 05:27

## 2017-12-20 RX ADMIN — INSULIN GLARGINE 20 UNIT(S): 100 INJECTION, SOLUTION SUBCUTANEOUS at 21:15

## 2017-12-20 RX ADMIN — Medication 8 UNIT(S): at 11:27

## 2017-12-20 RX ADMIN — Medication 8 UNIT(S): at 16:53

## 2017-12-20 RX ADMIN — ATORVASTATIN CALCIUM 80 MILLIGRAM(S): 80 TABLET, FILM COATED ORAL at 00:00

## 2017-12-20 RX ADMIN — Medication 40 MILLIEQUIVALENT(S): at 14:58

## 2017-12-20 RX ADMIN — Medication 10 UNIT(S): at 07:45

## 2017-12-20 NOTE — PROGRESS NOTE ADULT - ASSESSMENT
67 y/0 M w/h/o controlled T2DM per A1C. Also, HTN/HLD. Here with CP/SOB found to have  MVD now s/p CABG x2 on 12/16. Tolerating POs Glycemic control at goal except for persistent overnight BG readings <100s. Will continue to decrease insulin doses. No hypoglycemia. Stable creat/GFR and improved wbc. Going home soon. Spoke to pt and team about following recs.

## 2017-12-20 NOTE — PROGRESS NOTE ADULT - PROBLEM SELECTOR PLAN 2
Continue with ASA   Continue with statin  Continue with Lopressor 50 mg PO BID   follow up AM labs  Keep K>4.0  Mg>2.0  progressive ambulation  pulmonary toileting/incentive spirometry  pain management  D/C Plan home PT once medically cleared   Plan of care discussed with attending Continue with ASA   Continue with statin  Continue with Lopressor 50 mg PO BID   follow up AM labs  Keep K>4.0  Mg>2.0  progressive ambulation  pulmonary toileting/incentive spirometry  pain management  Sorbitol 30 mg PO x 1   D/C Plan home PT once medically cleared   Plan of care discussed with attending

## 2017-12-20 NOTE — PROGRESS NOTE ADULT - PROBLEM SELECTOR PLAN 3
Chest tube to LWS  follow daily CXR
Chest tube to LWS  follow daily CXR
Insulin dosage adjusted   pt. is NOT new to insuline as he was on insulin pre-op  Glycemic Control as per Endo   can resume his metformin on discharge
Insulin dosage adjusted   pt. is NOT new to insuline as he was on insulin pre-op  can resume his metformin on discharge

## 2017-12-20 NOTE — PROGRESS NOTE ADULT - PROBLEM SELECTOR PLAN 1
small right apical pneumothorax s/p CT removal   02 NC weaned off  PA/LA CXR   Non con Chest CT small right apical pneumothorax s/p CT removal --> Resolving   02 NC weaned off  PA/LA CXR

## 2017-12-20 NOTE — PROGRESS NOTE ADULT - PROBLEM SELECTOR PROBLEM 3
Chest tube in place
Chest tube in place
Type 2 diabetes mellitus with other circulatory complication, unspecified long term insulin use status
Type 2 diabetes mellitus with other circulatory complication, unspecified long term insulin use status

## 2017-12-20 NOTE — PROGRESS NOTE ADULT - SUBJECTIVE AND OBJECTIVE BOX
DIABETES FOLLOW UP NOTE: Saw pt earlier today  INTERVAL HX: 67 y/0 M w/h/o controlled T2DM on Tresiba plus Oral DM meds. Also, HTN/HLD. Here with CP/SOB found to have MVD now S/P CABGx2 ON 12/16. Reports eating well. Glycemic control mostly at goal except for overnight BG <100s again. BG GOAL 100S TO 180S. Per team possible discharge within next 24 hours.    Review of Systems: "I'm ok"  Cardiovascular: No chest pain, palpitations  Respiratory: No SOB, no cough  GI: No nausea, vomiting, abdominal pain  Endocrine: no polyuria, polydipsia or S&Sx of hypoglycemia    Allergies    No Known Allergies    Intolerances      MEDICATIONS :  atorvastatin 80 milliGRAM(s) Oral at bedtime  insulin glargine Injectable (LANTUS) 20 Unit(s) SubCutaneous at bedtime  insulin lispro (HumaLOG) corrective regimen sliding scale   SubCutaneous three times a day before meals  insulin lispro (HumaLOG) corrective regimen sliding scale   SubCutaneous at bedtime  insulin lispro Injectable (HumaLOG) 10 Unit(s) SubCutaneous three times a day before meals      PHYSICAL EXAM:  Vital Signs Last 24 Hrs  T(C): 36.5 (12-20-17 @ 05:34), Max: 36.7 (12-19-17 @ 20:11)  T(F): 97.7 (12-20-17 @ 05:34), Max: 98.1 (12-19-17 @ 20:11)  HR: 87 (12-20-17 @ 05:34) (87 - 88)  BP: 102/63 (12-20-17 @ 05:34) (102/63 - 118/63)  BP(mean): --  RR: 18 (12-20-17 @ 05:34) (18 - 18)  SpO2: 93% (12-20-17 @ 05:34) (93% - 99%)  GENERAL: Male lying in bed in NAD.   Chest: Sterna incision D&I.  Abdomen: Soft, nontender, non distended, central adiposity  Extremities: Warm, No edema in all 4 exts.  LLE graft donor site D&I  NEURO: A&O x3    LABS:  POCT Blood Glucose.: 183 mg/dL (12-20-17 @ 11:23)  POCT Blood Glucose.: 102 mg/dL (12-20-17 @ 07:21)  POCT Blood Glucose.: 78 mg/dL (12-20-17 @ 04:31)  POCT Blood Glucose.: 109 mg/dL (12-19-17 @ 21:30)  POCT Blood Glucose.: 105 mg/dL (12-19-17 @ 16:28)  POCT Blood Glucose.: 115 mg/dL (12-19-17 @ 12:04)  POCT Blood Glucose.: 105 mg/dL (12-19-17 @ 07:10)  POCT Blood Glucose.: 98 mg/dL (12-19-17 @ 02:11)  POCT Blood Glucose.: 76 mg/dL (12-18-17 @ 21:59)  POCT Blood Glucose.: 112 mg/dL (12-18-17 @ 16:27)                          12.8   8.7   )-----------( 206      ( 20 Dec 2017 06:09 )             37.9      12-20    137  |  102  |  19  ----------------------------<  126<H>  3.7   |  23  |  0.69    Ca    8.6      20 Dec 2017 06:09    TPro  5.9<L>  /  Alb  3.3  /  TBili  0.7  /  DBili  x   /  AST  30  /  ALT  49<H>  /  AlkPhos  41  12-20    eGFR if Non African American: 98 mL/min/1.73M2 (12-20-17 @ 06:09)  eGFR if African American: 114 mL/min/1.73M2 (12-20-17 @ 06:09)  eGFR if Non African American: 101 mL/min/1.73M2 (12-19-17 @ 06:10)  eGFR if African American: 117 mL/min/1.73M2 (12-19-17 @ 06:10)        Thyroid Function Tests:  12-16 @ 02:23 TSH 3.15 FreeT4 -- T3 83 Anti TPO -- Anti Thyroglobulin Ab -- TSI --      Hemoglobin A1C, Whole Blood: 7.2 % <H> [4.0 - 5.6] (12-16-17 @ 02:23)      12-16 Chol 190  HDL 36<L> Trig 236<H>

## 2017-12-20 NOTE — PROGRESS NOTE ADULT - SUBJECTIVE AND OBJECTIVE BOX
EP ATTENDING    tele: NSR, first degree AV delay, no significant events    no palpitations, no syncope, no angina    acetaminophen   Tablet. 650 milliGRAM(s) Oral every 6 hours PRN  aspirin enteric coated 325 milliGRAM(s) Oral daily  atorvastatin 80 milliGRAM(s) Oral at bedtime  dextrose 5%. 1000 milliLiter(s) IV Continuous <Continuous>  dextrose 50% Injectable 12.5 Gram(s) IV Push once  dextrose 50% Injectable 25 Gram(s) IV Push once  dextrose 50% Injectable 25 Gram(s) IV Push once  dextrose Gel 1 Dose(s) Oral once PRN  docusate sodium 100 milliGRAM(s) Oral three times a day  enoxaparin Injectable 40 milliGRAM(s) SubCutaneous daily  glucagon  Injectable 1 milliGRAM(s) IntraMuscular once PRN  insulin glargine Injectable (LANTUS) 20 Unit(s) SubCutaneous at bedtime  insulin lispro (HumaLOG) corrective regimen sliding scale   SubCutaneous at bedtime  insulin lispro (HumaLOG) corrective regimen sliding scale   SubCutaneous three times a day before meals  insulin lispro Injectable (HumaLOG) 8 Unit(s) SubCutaneous three times a day before meals  metoprolol     tartrate 50 milliGRAM(s) Oral two times a day  oxyCODONE    IR 5 milliGRAM(s) Oral every 4 hours PRN  polyethylene glycol 3350 17 Gram(s) Oral daily  sodium chloride 0.9%. 1000 milliLiter(s) IV Continuous <Continuous>                            12.8   8.7   )-----------( 206      ( 20 Dec 2017 06:09 )             37.9       12-20    137  |  102  |  19  ----------------------------<  126<H>  3.7   |  23  |  0.69    Ca    8.6      20 Dec 2017 06:09    TPro  5.9<L>  /  Alb  3.3  /  TBili  0.7  /  DBili  x   /  AST  30  /  ALT  49<H>  /  AlkPhos  41  12-20      T(C): 36.5 (12-20-17 @ 05:34), Max: 37.2 (12-19-17 @ 13:00)  HR: 87 (12-20-17 @ 05:34) (86 - 88)  BP: 102/63 (12-20-17 @ 05:34) (102/63 - 128/73)  RR: 18 (12-20-17 @ 05:34) (18 - 18)  SpO2: 93% (12-20-17 @ 05:34) (93% - 99%)  Wt(kg): --    no JVD  RRR, no murmurs  CTAB  soft nt/nd  no c/c/e    office echo: normal LVEF      A/P) 67 year old male PMH HTN, HLD, DM admitted with dyspnea on exertion. Found to have LM disease now s/p CABG. EP called for bifascicular block. No syncope, presyncope, nor heart block     -given the above there is no indication for PPM at this time  -no contraindication to beta blockers for CAD  -supportive care as per CTS  -f/u with Kris after discharge

## 2017-12-20 NOTE — PROGRESS NOTE ADULT - PROBLEM SELECTOR PLAN 4
GI PPX: PPI  DVT PPX:scd +lovenox  afib ppx: beta blocker  graft patency:  statin+asa

## 2017-12-20 NOTE — PROGRESS NOTE ADULT - SUBJECTIVE AND OBJECTIVE BOX
S: no chest pain or sob         MEDICATIONS  (STANDING):  aspirin enteric coated 325 milliGRAM(s) Oral daily  atorvastatin 80 milliGRAM(s) Oral at bedtime  docusate sodium 100 milliGRAM(s) Oral three times a day  enoxaparin Injectable 40 milliGRAM(s) SubCutaneous daily  insulin glargine Injectable (LANTUS) 20 Unit(s) SubCutaneous at bedtime  insulin lispro (HumaLOG) corrective regimen sliding scale   SubCutaneous at bedtime  insulin lispro (HumaLOG) corrective regimen sliding scale   SubCutaneous three times a day before meals  insulin lispro Injectable (HumaLOG) 8 Unit(s) SubCutaneous three times a day before meals  metoprolol     tartrate 50 milliGRAM(s) Oral two times a day  polyethylene glycol 3350 17 Gram(s) Oral daily  sodium chloride 0.9%. 1000 milliLiter(s) (10 mL/Hr) IV Continuous <Continuous>    MEDICATIONS  (PRN):  acetaminophen   Tablet. 650 milliGRAM(s) Oral every 6 hours PRN Mild Pain (1 - 3)  dextrose Gel 1 Dose(s) Oral once PRN Blood Glucose LESS THAN 70 milliGRAM(s)/deciLiter  glucagon  Injectable 1 milliGRAM(s) IntraMuscular once PRN Glucose <70 milliGRAM(s)/deciLiter  oxyCODONE    IR 5 milliGRAM(s) Oral every 4 hours PRN Moderate Pain (4 - 6)      LABS:                        12.8   8.7   )-----------( 206      ( 20 Dec 2017 06:09 )             37.9     Hemoglobin: 12.8 g/dL (12-20 @ 06:09)  Hemoglobin: 12.8 g/dL (12-19 @ 06:10)  Hemoglobin: 11.8 g/dL (12-18 @ 03:16)  Hemoglobin: 12.1 g/dL (12-17 @ 01:50)  Hemoglobin: 12.1 g/dL (12-16 @ 18:47)    12-20    137  |  102  |  19  ----------------------------<  126<H>  3.7   |  23  |  0.69    Ca    8.6      20 Dec 2017 06:09    TPro  5.9<L>  /  Alb  3.3  /  TBili  0.7  /  DBili  x   /  AST  30  /  ALT  49<H>  /  AlkPhos  41  12-20    Creatinine Trend: 0.69<--, 0.65<--, 0.71<--, 0.75<--, 0.78<--, 0.79<--       PHYSICAL EXAM  Vital Signs Last 24 Hrs  T(C): 36.5 (20 Dec 2017 05:34), Max: 37.2 (19 Dec 2017 13:00)  T(F): 97.7 (20 Dec 2017 05:34), Max: 99 (19 Dec 2017 13:00)  HR: 87 (20 Dec 2017 05:34) (86 - 88)  BP: 102/63 (20 Dec 2017 05:34) (102/63 - 128/73)  BP(mean): --  RR: 18 (20 Dec 2017 05:34) (18 - 18)  SpO2: 93% (20 Dec 2017 05:34) (93% - 99%)          Heart: normal S1, S2, RRR, no m/r/g  Lungs: cta b/l  Abd: soft nT, nD  Ext: no edema     TELEMETRY: SR    DIAGNOSTIC TESTING:  [ ] Echocardiogram:  intra op - KAE- NL lv fx  [ ]  Catheterization: < from: Cardiac Cath Lab - Adult (12.15.17 @ 16:43) >  LM:   --  Ostial LM: There was a discrete 95 % stenosis.  LAD:   --  Ostial LAD: The vessel was heavily calcified. There was a 99 %  stenosis. There was PERLITA grade 2 flow through the vessel (partial  perfusion).  --  Proximal LAD: Angiography showed mild atherosclerosis with no flow  limiting lesions.  --  Mid LAD: There was a tubular 50 % stenosis.  --  Distal LAD: Angiography showed mild atherosclerosis with no flow  limiting lesions.  --  D1: Angiography showed mild atherosclerosis with no flow limiting  lesions.  CX:   --  Proximal circumflex: Angiography showed minor luminal  irregularities with no flow limiting lesions.  --  Mid circumflex: Angiography showed minor luminal irregularities with no  flow limiting lesions.  --  Distal circumflex: Angiography showed mild atherosclerosis with no flow  limiting lesions.  --  OM1: Angiography showed mild atherosclerosis with no flow limiting  lesions.  --  OM2: Angiography showed minor luminal irregularities with no flow  limiting lesions.  RCA:   --  Proximal RCA: Angiography showed mild atherosclerosis with no  flow limiting lesions.  --  Mid RCA: There was a discrete 50 % stenosis. In a second lesion, there  was a discrete 20 % stenosis.  --  Distal RCA: Angiography showed minor luminal irregularities with no  flow limiting lesions.    < end of copied text >    OTHER: 	        ASSESSMENT/PLAN: 	67y Male hx of dm, htn, chol, obesity admitted with new onset angina found to have severe LM disease now s/p CABG X 2 with right PTX s/p chest tube removal :   -Progressing well  -continue with asa/statin/beta blockers  -f/u cts  -tx of PTx per CTS      Sheron Christopher MD  [facility]  [address] [facility], [address]  [place] [address]  office: [phone]  pager: [phone]

## 2017-12-20 NOTE — PROGRESS NOTE ADULT - PROBLEM SELECTOR PLAN 1
-Test BG ac and hs and 2am tonight!  Decrease Lantus dose to 20 units q hs  Decrease Humalog to 8 units ac meals   Continue Humalog correction scales ac and hs low dose while in hospital only!  Noted RD consult   -Upon discharge restart pre admission regimen of Tresiba 15 units q hs plus  Jardiance 25mg qam plus Metformin 1000mg daily (pt's renal function stable and insulin requirements in hospital continue to come down).  -Pt states he will make his own f/u DM apt upon discharge  -Plan discussed with pt/team.  Contact info: [phone] (24/7). pager [phone]

## 2017-12-20 NOTE — PROGRESS NOTE ADULT - SUBJECTIVE AND OBJECTIVE BOX
c/o incisional discomfort  Patient with no anginal chest pain or shortness of breath        MEDICATIONS  (STANDING):  aspirin enteric coated 325 milliGRAM(s) Oral daily  atorvastatin 80 milliGRAM(s) Oral at bedtime  docusate sodium 100 milliGRAM(s) Oral three times a day  enoxaparin Injectable 40 milliGRAM(s) SubCutaneous daily  insulin glargine Injectable (LANTUS) 20 Unit(s) SubCutaneous at bedtime  insulin lispro (HumaLOG) corrective regimen sliding scale   SubCutaneous at bedtime  insulin lispro (HumaLOG) corrective regimen sliding scale   SubCutaneous three times a day before meals  insulin lispro Injectable (HumaLOG) 8 Unit(s) SubCutaneous three times a day before meals  metoprolol     tartrate 50 milliGRAM(s) Oral two times a day  polyethylene glycol 3350 17 Gram(s) Oral daily  sodium chloride 0.9%. 1000 milliLiter(s) (10 mL/Hr) IV Continuous <Continuous>    MEDICATIONS  (PRN):  acetaminophen   Tablet. 650 milliGRAM(s) Oral every 6 hours PRN Mild Pain (1 - 3)  dextrose Gel 1 Dose(s) Oral once PRN Blood Glucose LESS THAN 70 milliGRAM(s)/deciLiter  glucagon  Injectable 1 milliGRAM(s) IntraMuscular once PRN Glucose <70 milliGRAM(s)/deciLiter  oxyCODONE    IR 5 milliGRAM(s) Oral every 4 hours PRN Moderate Pain (4 - 6)      LABS:                        12.8   8.7   )-----------( 206      ( 20 Dec 2017 06:09 )             37.9     Hemoglobin: 12.8 g/dL (12-20 @ 06:09)  Hemoglobin: 12.8 g/dL (12-19 @ 06:10)  Hemoglobin: 11.8 g/dL (12-18 @ 03:16)  Hemoglobin: 12.1 g/dL (12-17 @ 01:50)  Hemoglobin: 12.1 g/dL (12-16 @ 18:47)    12-20    137  |  102  |  19  ----------------------------<  126<H>  3.7   |  23  |  0.69    Ca    8.6      20 Dec 2017 06:09    TPro  5.9<L>  /  Alb  3.3  /  TBili  0.7  /  DBili  x   /  AST  30  /  ALT  49<H>  /  AlkPhos  41  12-20    Creatinine Trend: 0.69<--, 0.65<--, 0.71<--, 0.75<--, 0.78<--, 0.79<--       PHYSICAL EXAM  Vital Signs Last 24 Hrs  T(C): 36.5 (20 Dec 2017 05:34), Max: 37.2 (19 Dec 2017 13:00)  T(F): 97.7 (20 Dec 2017 05:34), Max: 99 (19 Dec 2017 13:00)  HR: 87 (20 Dec 2017 05:34) (86 - 88)  BP: 102/63 (20 Dec 2017 05:34) (102/63 - 128/73)  BP(mean): --  RR: 18 (20 Dec 2017 05:34) (18 - 18)  SpO2: 93% (20 Dec 2017 05:34) (93% - 99%)          HEENT:   Normal oral mucosa, PERRL, EOMI	  Lymphatic: No lymphadenopathy , no edema  Cardiovascular: Normal S1 S2, No JVD, No murmurs , Peripheral pulses palpable 2+ bilaterally  Respiratory: Lungs clear to auscultation, normal effort 	  Gastrointestinal:  Soft, Non-tender, + BS	      TELEMETRY: 	nsr      DIAGNOSTIC TESTING:  [ ] Echocardiogram:  intra op - KAE- NL lv fx  [ ]  Catheterization: diffuse multi vessel disease   OTHER: 	        ASSESSMENT/PLAN: 	67y Male hx of dm, htn, chol, obesity admitted with new onset angina found to have severe LM disease now s/p CABG X 2  -Progressing well  -continue with asa/statin/beta blockers  -f/u cts  - f/u with Dr Ponce upon DC 12/26 @ 12pm    [facility]annon Ferrell [facility]  O:  [phone]  P: [phone] c/o incisional discomfort  Patient with no anginal chest pain or shortness of breath        MEDICATIONS  (STANDING):  aspirin enteric coated 325 milliGRAM(s) Oral daily  atorvastatin 80 milliGRAM(s) Oral at bedtime  docusate sodium 100 milliGRAM(s) Oral three times a day  enoxaparin Injectable 40 milliGRAM(s) SubCutaneous daily  insulin glargine Injectable (LANTUS) 20 Unit(s) SubCutaneous at bedtime  insulin lispro (HumaLOG) corrective regimen sliding scale   SubCutaneous at bedtime  insulin lispro (HumaLOG) corrective regimen sliding scale   SubCutaneous three times a day before meals  insulin lispro Injectable (HumaLOG) 8 Unit(s) SubCutaneous three times a day before meals  metoprolol     tartrate 50 milliGRAM(s) Oral two times a day  polyethylene glycol 3350 17 Gram(s) Oral daily  sodium chloride 0.9%. 1000 milliLiter(s) (10 mL/Hr) IV Continuous <Continuous>    MEDICATIONS  (PRN):  acetaminophen   Tablet. 650 milliGRAM(s) Oral every 6 hours PRN Mild Pain (1 - 3)  dextrose Gel 1 Dose(s) Oral once PRN Blood Glucose LESS THAN 70 milliGRAM(s)/deciLiter  glucagon  Injectable 1 milliGRAM(s) IntraMuscular once PRN Glucose <70 milliGRAM(s)/deciLiter  oxyCODONE    IR 5 milliGRAM(s) Oral every 4 hours PRN Moderate Pain (4 - 6)      LABS:                        12.8   8.7   )-----------( 206      ( 20 Dec 2017 06:09 )             37.9     Hemoglobin: 12.8 g/dL (12-20 @ 06:09)  Hemoglobin: 12.8 g/dL (12-19 @ 06:10)  Hemoglobin: 11.8 g/dL (12-18 @ 03:16)  Hemoglobin: 12.1 g/dL (12-17 @ 01:50)  Hemoglobin: 12.1 g/dL (12-16 @ 18:47)    12-20    137  |  102  |  19  ----------------------------<  126<H>  3.7   |  23  |  0.69    Ca    8.6      20 Dec 2017 06:09    TPro  5.9<L>  /  Alb  3.3  /  TBili  0.7  /  DBili  x   /  AST  30  /  ALT  49<H>  /  AlkPhos  41  12-20    Creatinine Trend: 0.69<--, 0.65<--, 0.71<--, 0.75<--, 0.78<--, 0.79<--       PHYSICAL EXAM  Vital Signs Last 24 Hrs  T(C): 36.5 (20 Dec 2017 05:34), Max: 37.2 (19 Dec 2017 13:00)  T(F): 97.7 (20 Dec 2017 05:34), Max: 99 (19 Dec 2017 13:00)  HR: 87 (20 Dec 2017 05:34) (86 - 88)  BP: 102/63 (20 Dec 2017 05:34) (102/63 - 128/73)  BP(mean): --  RR: 18 (20 Dec 2017 05:34) (18 - 18)  SpO2: 93% (20 Dec 2017 05:34) (93% - 99%)          HEENT:   Normal oral mucosa, PERRL, EOMI	  Lymphatic: No lymphadenopathy , no edema  Cardiovascular: Normal S1 S2, No JVD, No murmurs , Peripheral pulses palpable 2+ bilaterally  Respiratory: Lungs clear to auscultation, normal effort 	  Gastrointestinal:  Soft, Non-tender, + BS	      TELEMETRY: 	nsr      DIAGNOSTIC TESTING:  [ ] Echocardiogram:  intra op - KAE- NL lv fx  [ ]  Catheterization: diffuse multi vessel disease   OTHER: 	        ASSESSMENT/PLAN: 	67y Male hx of dm, htn, chol, obesity admitted with new onset angina found to have severe LM disease now s/p CABG X 2 with right PTX s/p chest tube removal :   -Progressing well  -continue with asa/statin/beta blockers  -f/u cts  - f/u cxr from today 12/20  - f/u with Dr Ponce upon DC 12/26 @ 12pm    Shannon Ferrell [facility]  [facility]  O:  [phone]  P: [phone] c/o incisional discomfort  Patient with no anginal chest pain or shortness of breath        MEDICATIONS  (STANDING):  aspirin enteric coated 325 milliGRAM(s) Oral daily  atorvastatin 80 milliGRAM(s) Oral at bedtime  docusate sodium 100 milliGRAM(s) Oral three times a day  enoxaparin Injectable 40 milliGRAM(s) SubCutaneous daily  insulin glargine Injectable (LANTUS) 20 Unit(s) SubCutaneous at bedtime  insulin lispro (HumaLOG) corrective regimen sliding scale   SubCutaneous at bedtime  insulin lispro (HumaLOG) corrective regimen sliding scale   SubCutaneous three times a day before meals  insulin lispro Injectable (HumaLOG) 8 Unit(s) SubCutaneous three times a day before meals  metoprolol     tartrate 50 milliGRAM(s) Oral two times a day  polyethylene glycol 3350 17 Gram(s) Oral daily  sodium chloride 0.9%. 1000 milliLiter(s) (10 mL/Hr) IV Continuous <Continuous>    MEDICATIONS  (PRN):  acetaminophen   Tablet. 650 milliGRAM(s) Oral every 6 hours PRN Mild Pain (1 - 3)  dextrose Gel 1 Dose(s) Oral once PRN Blood Glucose LESS THAN 70 milliGRAM(s)/deciLiter  glucagon  Injectable 1 milliGRAM(s) IntraMuscular once PRN Glucose <70 milliGRAM(s)/deciLiter  oxyCODONE    IR 5 milliGRAM(s) Oral every 4 hours PRN Moderate Pain (4 - 6)      LABS:                        12.8   8.7   )-----------( 206      ( 20 Dec 2017 06:09 )             37.9     Hemoglobin: 12.8 g/dL (12-20 @ 06:09)  Hemoglobin: 12.8 g/dL (12-19 @ 06:10)  Hemoglobin: 11.8 g/dL (12-18 @ 03:16)  Hemoglobin: 12.1 g/dL (12-17 @ 01:50)  Hemoglobin: 12.1 g/dL (12-16 @ 18:47)    12-20    137  |  102  |  19  ----------------------------<  126<H>  3.7   |  23  |  0.69    Ca    8.6      20 Dec 2017 06:09    TPro  5.9<L>  /  Alb  3.3  /  TBili  0.7  /  DBili  x   /  AST  30  /  ALT  49<H>  /  AlkPhos  41  12-20    Creatinine Trend: 0.69<--, 0.65<--, 0.71<--, 0.75<--, 0.78<--, 0.79<--       PHYSICAL EXAM  Vital Signs Last 24 Hrs  T(C): 36.5 (20 Dec 2017 05:34), Max: 37.2 (19 Dec 2017 13:00)  T(F): 97.7 (20 Dec 2017 05:34), Max: 99 (19 Dec 2017 13:00)  HR: 87 (20 Dec 2017 05:34) (86 - 88)  BP: 102/63 (20 Dec 2017 05:34) (102/63 - 128/73)  BP(mean): --  RR: 18 (20 Dec 2017 05:34) (18 - 18)  SpO2: 93% (20 Dec 2017 05:34) (93% - 99%)          HEENT:   Normal oral mucosa, PERRL, EOMI	  Lymphatic: No lymphadenopathy , no edema  Cardiovascular: Normal S1 S2, No JVD, No murmurs , Peripheral pulses palpable 2+ bilaterally  Respiratory: Lungs clear to auscultation, normal effort 	  Gastrointestinal:  Soft, Non-tender, + BS	      TELEMETRY: 	nsr      DIAGNOSTIC TESTING:  [ ] Echocardiogram:  intra op - KAE- NL lv fx  [ ]  Catheterization: diffuse multi vessel disease   OTHER: 	        ASSESSMENT/PLAN: 	67y Male hx of dm, htn, chol, obesity admitted with new onset angina found to have severe LM disease now s/p CABG X 2 with right PTX s/p chest tube removal :   -Progressing well  -continue with asa/statin/beta blockers  -f/u cts  - f/u cxr from today 12/20 given PTX  - f/u with Dr Frye 12/27 at 1230pm    Shannon Ferrell [facility]  O:  [phone]  P: [phone]

## 2017-12-20 NOTE — PROGRESS NOTE ADULT - SUBJECTIVE AND OBJECTIVE BOX
VITAL SIGNS    Subjective: "Hello, I feel ok"    Telemetry: NSR 70-80     Vital Signs Last 24 Hrs  T(C): 37.3 (- @ 13:34), Max: 37.3 (-17 @ 13:34)  T(F): 99.1 (17 @ 13:34), Max: 99.1 (-17 @ 13:34)  HR: 84 (17 @ 13:34) (84 - 88)  BP: 116/68 (-17 @ 13:34) (102/63 - 118/63)  RR: 18 (17 @ 13:34) (18 - 18)  SpO2: 96% (17 @ 13:34) (93% - 99%)              @ 07:01  -   @ 07:00  --------------------------------------------------------  IN: 1060 mL / OUT: 700 mL / NET: 360 mL     @ 07:01  -   @ 14:51  --------------------------------------------------------  IN: 980 mL / OUT: 1050 mL / NET: -70 mL    Daily     Daily Weight in k.6 (20 Dec 2017 07:56)      CAPILLARY BLOOD GLUCOSE      POCT Blood Glucose.: 183 mg/dL (20 Dec 2017 11:23)  POCT Blood Glucose.: 102 mg/dL (20 Dec 2017 07:21)  POCT Blood Glucose.: 78 mg/dL (20 Dec 2017 04:31)  POCT Blood Glucose.: 109 mg/dL (19 Dec 2017 21:30)  POCT Blood Glucose.: 105 mg/dL (19 Dec 2017 16:28)          PHYSICAL EXAM      Neurology: alert and oriented x 3, nonfocal, no gross deficits    CV: (+) S1 and S2, No murmurs, rubs, gallops or clicks     Sternal Wound:  MSI -->CDI , sternum table    Lungs: CTA B/L     Abdomen: soft, nontender, nondistended, positive bowel sounds, (+) Flatus; (+) BM     :  Voiding               Extremities:  B/L LE (+) edema; negative calf tenderness; (+) 2 DP palpable          acetaminophen   Tablet. 650 milliGRAM(s) Oral every 6 hours PRN  aspirin enteric coated 325 milliGRAM(s) Oral daily  atorvastatin 80 milliGRAM(s) Oral at bedtime  docusate sodium 100 milliGRAM(s) Oral three times a day  enoxaparin Injectable 40 milliGRAM(s) SubCutaneous daily  glucagon  Injectable 1 milliGRAM(s) IntraMuscular once PRN  insulin glargine Injectable (LANTUS) 20 Unit(s) SubCutaneous at bedtime  insulin lispro (HumaLOG) corrective regimen sliding scale   SubCutaneous three times a day before meals  insulin lispro (HumaLOG) corrective regimen sliding scale   SubCutaneous at bedtime  insulin lispro Injectable (HumaLOG) 8 Unit(s) SubCutaneous three times a day before meals  metoprolol     tartrate 50 milliGRAM(s) Oral two times a day  oxyCODONE    IR 5 milliGRAM(s) Oral every 4 hours PRN  polyethylene glycol 3350 17 Gram(s) Oral daily  potassium chloride    Tablet ER 40 milliEquivalent(s) Oral once  sodium chloride 0.9%. 1000 milliLiter(s) IV Continuous <Continuous>      Physical Therapy Rec:   Home  [  ]   Home w/ PT  [X  ]  Rehab  [  ]    Discussed with Cardiothoracic Team at AM rounds.

## 2017-12-20 NOTE — PROGRESS NOTE ADULT - ASSESSMENT
67M with pmh of HTN, HLD, DM who presented to cardiologist office with complaints of exertional dyspnea and exertional CP.  He underwent nuclear stress test revealing severe anterolateral wall ischemia and was subsequently admitted to [facility] for CATH which showed diffuse multi vessel coronary disease.   On 12/16/17 s/p CABG x 2 LIMA / EVH   Post-operative course:  12/17: Transferred to step down  D/c chest tube today  Ambulate Beta blockers  12/18 Transferred to Floor 2 [facility]   12/19/17 - CXR this am shows small right apical ptx - patient asymptomatic - O2 3L n/c placed on pt.  will ck CXR later- Lantus dose adjusted - per house endo  d/c pw's as per Dr. Cuellar  Disposition: plan home in am if ptx stable 12/20 12/20 [place]/ [place] CXR negative for PTX --> plan for NON Con chest CT today to evaluate for complete resolution of PTX   Disposition: Home PT once medically cleared 67M with pmh of HTN, HLD, DM who presented to cardiologist office with complaints of exertional dyspnea and exertional CP.  He underwent nuclear stress test revealing severe anterolateral wall ischemia and was subsequently admitted to [facility] for CATH which showed diffuse multi vessel coronary disease.   On 12/16/17 s/p CABG x 2 LIMA / EVH   Post-operative course:  12/17: Transferred to step down  D/c chest tube today  Ambulate Beta blockers  12/18 Transferred to Floor 2 [facility]   12/19/17 - CXR this am shows small right apical ptx - patient asymptomatic - O2 3L n/c placed on pt.  will ck CXR later- Lantus dose adjusted - per house endo  d/c pw's as per Dr. Cuellar  Disposition: plan home in am if ptx stable 12/20 12/20 PA/ [place] CXR negative for PTX   Disposition: Home PT once medically cleared 67M with pmh of HTN, HLD, DM who presented to cardiologist office with complaints of exertional dyspnea and exertional CP.  He underwent nuclear stress test revealing severe anterolateral wall ischemia and was subsequently admitted to [facility] for CATH which showed diffuse multi vessel coronary disease.   On 12/16/17 s/p CABG x 2 LIMA / EVH   Post-operative course:  12/17: Transferred to step down  D/c chest tube today  Post op Bifascicular block --> EP following; no PPM warranted at this time   BB initiated --> tolerating well   12/18 Transferred to Floor 2 [facility]   12/19/17 - CXR this am shows small right apical ptx - patient asymptomatic - O2 3L n/c placed on pt.  will ck CXR later- Lantus dose adjusted - per house endo  d/c pw's as per Dr. Cuellar  Disposition: plan home in am if ptx stable 12/20 12/20 PA/ [place] CXR negative for PTX   Disposition: Home PT once medically cleared

## 2017-12-21 ENCOUNTER — TRANSCRIPTION ENCOUNTER (OUTPATIENT)
Age: 67
End: 2017-12-21

## 2017-12-21 VITALS
DIASTOLIC BLOOD PRESSURE: 80 MMHG | OXYGEN SATURATION: 95 % | HEART RATE: 75 BPM | TEMPERATURE: 98 F | RESPIRATION RATE: 18 BRPM | SYSTOLIC BLOOD PRESSURE: 120 MMHG

## 2017-12-21 PROBLEM — I10 ESSENTIAL (PRIMARY) HYPERTENSION: Chronic | Status: ACTIVE | Noted: 2017-12-15

## 2017-12-21 PROBLEM — E78.5 HYPERLIPIDEMIA, UNSPECIFIED: Chronic | Status: ACTIVE | Noted: 2017-12-15

## 2017-12-21 LAB
ANION GAP SERPL CALC-SCNC: 12 MMOL/L — SIGNIFICANT CHANGE UP (ref 5–17)
BUN SERPL-MCNC: 15 MG/DL — SIGNIFICANT CHANGE UP (ref 7–23)
CALCIUM SERPL-MCNC: 8.7 MG/DL — SIGNIFICANT CHANGE UP (ref 8.4–10.5)
CHLORIDE SERPL-SCNC: 102 MMOL/L — SIGNIFICANT CHANGE UP (ref 96–108)
CO2 SERPL-SCNC: 22 MMOL/L — SIGNIFICANT CHANGE UP (ref 22–31)
CREAT SERPL-MCNC: 0.73 MG/DL — SIGNIFICANT CHANGE UP (ref 0.5–1.3)
GLUCOSE BLDC GLUCOMTR-MCNC: 101 MG/DL — HIGH (ref 70–99)
GLUCOSE BLDC GLUCOMTR-MCNC: 85 MG/DL — SIGNIFICANT CHANGE UP (ref 70–99)
GLUCOSE SERPL-MCNC: 84 MG/DL — SIGNIFICANT CHANGE UP (ref 70–99)
HCT VFR BLD CALC: 39.5 % — SIGNIFICANT CHANGE UP (ref 39–50)
HGB BLD-MCNC: 13.3 G/DL — SIGNIFICANT CHANGE UP (ref 13–17)
MCHC RBC-ENTMCNC: 30.7 PG — SIGNIFICANT CHANGE UP (ref 27–34)
MCHC RBC-ENTMCNC: 33.6 GM/DL — SIGNIFICANT CHANGE UP (ref 32–36)
MCV RBC AUTO: 91.3 FL — SIGNIFICANT CHANGE UP (ref 80–100)
PLATELET # BLD AUTO: 272 K/UL — SIGNIFICANT CHANGE UP (ref 150–400)
POTASSIUM SERPL-MCNC: 4.2 MMOL/L — SIGNIFICANT CHANGE UP (ref 3.5–5.3)
POTASSIUM SERPL-SCNC: 4.2 MMOL/L — SIGNIFICANT CHANGE UP (ref 3.5–5.3)
RBC # BLD: 4.33 M/UL — SIGNIFICANT CHANGE UP (ref 4.2–5.8)
RBC # FLD: 12.3 % — SIGNIFICANT CHANGE UP (ref 10.3–14.5)
SODIUM SERPL-SCNC: 136 MMOL/L — SIGNIFICANT CHANGE UP (ref 135–145)
WBC # BLD: 10 K/UL — SIGNIFICANT CHANGE UP (ref 3.8–10.5)
WBC # FLD AUTO: 10 K/UL — SIGNIFICANT CHANGE UP (ref 3.8–10.5)

## 2017-12-21 PROCEDURE — 80053 COMPREHEN METABOLIC PANEL: CPT

## 2017-12-21 PROCEDURE — 94002 VENT MGMT INPAT INIT DAY: CPT

## 2017-12-21 PROCEDURE — 85576 BLOOD PLATELET AGGREGATION: CPT

## 2017-12-21 PROCEDURE — 84484 ASSAY OF TROPONIN QUANT: CPT

## 2017-12-21 PROCEDURE — P9016: CPT

## 2017-12-21 PROCEDURE — 85610 PROTHROMBIN TIME: CPT

## 2017-12-21 PROCEDURE — 85014 HEMATOCRIT: CPT

## 2017-12-21 PROCEDURE — 90686 IIV4 VACC NO PRSV 0.5 ML IM: CPT

## 2017-12-21 PROCEDURE — C1889: CPT

## 2017-12-21 PROCEDURE — 83036 HEMOGLOBIN GLYCOSYLATED A1C: CPT

## 2017-12-21 PROCEDURE — 84443 ASSAY THYROID STIM HORMONE: CPT

## 2017-12-21 PROCEDURE — 86901 BLOOD TYPING SEROLOGIC RH(D): CPT

## 2017-12-21 PROCEDURE — 80048 BASIC METABOLIC PNL TOTAL CA: CPT

## 2017-12-21 PROCEDURE — 86891 AUTOLOGOUS BLOOD OP SALVAGE: CPT

## 2017-12-21 PROCEDURE — 82553 CREATINE MB FRACTION: CPT

## 2017-12-21 PROCEDURE — 36430 TRANSFUSION BLD/BLD COMPNT: CPT

## 2017-12-21 PROCEDURE — 71045 X-RAY EXAM CHEST 1 VIEW: CPT

## 2017-12-21 PROCEDURE — 84132 ASSAY OF SERUM POTASSIUM: CPT

## 2017-12-21 PROCEDURE — 86850 RBC ANTIBODY SCREEN: CPT

## 2017-12-21 PROCEDURE — 71046 X-RAY EXAM CHEST 2 VIEWS: CPT

## 2017-12-21 PROCEDURE — 84295 ASSAY OF SERUM SODIUM: CPT

## 2017-12-21 PROCEDURE — 93005 ELECTROCARDIOGRAM TRACING: CPT

## 2017-12-21 PROCEDURE — 85384 FIBRINOGEN ACTIVITY: CPT

## 2017-12-21 PROCEDURE — 84480 ASSAY TRIIODOTHYRONINE (T3): CPT

## 2017-12-21 PROCEDURE — 86900 BLOOD TYPING SEROLOGIC ABO: CPT

## 2017-12-21 PROCEDURE — 82330 ASSAY OF CALCIUM: CPT

## 2017-12-21 PROCEDURE — P9045: CPT

## 2017-12-21 PROCEDURE — 82550 ASSAY OF CK (CPK): CPT

## 2017-12-21 PROCEDURE — 82947 ASSAY GLUCOSE BLOOD QUANT: CPT

## 2017-12-21 PROCEDURE — 97116 GAIT TRAINING THERAPY: CPT

## 2017-12-21 PROCEDURE — C1729: CPT

## 2017-12-21 PROCEDURE — 99239 HOSP IP/OBS DSCHRG MGMT >30: CPT

## 2017-12-21 PROCEDURE — 82962 GLUCOSE BLOOD TEST: CPT

## 2017-12-21 PROCEDURE — C1751: CPT

## 2017-12-21 PROCEDURE — 97162 PT EVAL MOD COMPLEX 30 MIN: CPT

## 2017-12-21 PROCEDURE — 85730 THROMBOPLASTIN TIME PARTIAL: CPT

## 2017-12-21 PROCEDURE — 97530 THERAPEUTIC ACTIVITIES: CPT

## 2017-12-21 PROCEDURE — C1769: CPT

## 2017-12-21 PROCEDURE — P9047: CPT

## 2017-12-21 PROCEDURE — 86923 COMPATIBILITY TEST ELECTRIC: CPT

## 2017-12-21 PROCEDURE — 85027 COMPLETE CBC AUTOMATED: CPT

## 2017-12-21 PROCEDURE — 82803 BLOOD GASES ANY COMBINATION: CPT

## 2017-12-21 PROCEDURE — 80061 LIPID PANEL: CPT

## 2017-12-21 PROCEDURE — 82435 ASSAY OF BLOOD CHLORIDE: CPT

## 2017-12-21 PROCEDURE — 83605 ASSAY OF LACTIC ACID: CPT

## 2017-12-21 PROCEDURE — 84436 ASSAY OF TOTAL THYROXINE: CPT

## 2017-12-21 RX ORDER — EMPAGLIFLOZIN 10 MG/1
1 TABLET, FILM COATED ORAL
Qty: 0 | Refills: 0 | COMMUNITY

## 2017-12-21 RX ORDER — INSULIN DEGLUDEC 100 U/ML
20 INJECTION, SOLUTION SUBCUTANEOUS
Qty: 0 | Refills: 0 | COMMUNITY
Start: 2017-12-21 | End: 2018-01-19

## 2017-12-21 RX ORDER — METOPROLOL TARTRATE 50 MG
1 TABLET ORAL
Qty: 60 | Refills: 0 | OUTPATIENT
Start: 2017-12-21 | End: 2018-01-19

## 2017-12-21 RX ORDER — DOCUSATE SODIUM 100 MG
1 CAPSULE ORAL
Qty: 90 | Refills: 0 | OUTPATIENT
Start: 2017-12-21 | End: 2018-01-19

## 2017-12-21 RX ORDER — OXYCODONE HYDROCHLORIDE 5 MG/1
1 TABLET ORAL
Qty: 30 | Refills: 0 | OUTPATIENT
Start: 2017-12-21 | End: 2017-12-25

## 2017-12-21 RX ORDER — INSULIN DEGLUDEC 100 U/ML
15 INJECTION, SOLUTION SUBCUTANEOUS
Qty: 0 | Refills: 0 | COMMUNITY

## 2017-12-21 RX ORDER — ACETAMINOPHEN 500 MG
2 TABLET ORAL
Qty: 240 | Refills: 0 | OUTPATIENT
Start: 2017-12-21 | End: 2018-01-19

## 2017-12-21 RX ORDER — ASPIRIN/CALCIUM CARB/MAGNESIUM 324 MG
1 TABLET ORAL
Qty: 30 | Refills: 0 | OUTPATIENT
Start: 2017-12-21 | End: 2018-01-19

## 2017-12-21 RX ORDER — POLYETHYLENE GLYCOL 3350 17 G/17G
17 POWDER, FOR SOLUTION ORAL
Qty: 1 | Refills: 0 | OUTPATIENT
Start: 2017-12-21 | End: 2018-01-03

## 2017-12-21 RX ORDER — INSULIN DEGLUDEC 100 U/ML
15 INJECTION, SOLUTION SUBCUTANEOUS
Qty: 1 | Refills: 0 | OUTPATIENT
Start: 2017-12-21 | End: 2018-01-19

## 2017-12-21 RX ORDER — ATORVASTATIN CALCIUM 80 MG/1
1 TABLET, FILM COATED ORAL
Qty: 30 | Refills: 0 | OUTPATIENT
Start: 2017-12-21 | End: 2018-01-19

## 2017-12-21 RX ORDER — INFLUENZA VIRUS VACCINE 15; 15; 15; 15 UG/.5ML; UG/.5ML; UG/.5ML; UG/.5ML
0.5 SUSPENSION INTRAMUSCULAR ONCE
Qty: 0 | Refills: 0 | Status: COMPLETED | OUTPATIENT
Start: 2017-12-21 | End: 2017-12-21

## 2017-12-21 RX ORDER — EMPAGLIFLOZIN 10 MG/1
1 TABLET, FILM COATED ORAL
Qty: 30 | Refills: 0 | OUTPATIENT
Start: 2017-12-21 | End: 2018-01-19

## 2017-12-21 RX ADMIN — Medication 650 MILLIGRAM(S): at 08:05

## 2017-12-21 RX ADMIN — POLYETHYLENE GLYCOL 3350 17 GRAM(S): 17 POWDER, FOR SOLUTION ORAL at 11:07

## 2017-12-21 RX ADMIN — Medication 325 MILLIGRAM(S): at 11:07

## 2017-12-21 RX ADMIN — INFLUENZA VIRUS VACCINE 0.5 MILLILITER(S): 15; 15; 15; 15 SUSPENSION INTRAMUSCULAR at 11:04

## 2017-12-21 RX ADMIN — ENOXAPARIN SODIUM 40 MILLIGRAM(S): 100 INJECTION SUBCUTANEOUS at 11:07

## 2017-12-21 RX ADMIN — Medication 100 MILLIGRAM(S): at 05:54

## 2017-12-21 RX ADMIN — Medication 8 UNIT(S): at 08:01

## 2017-12-21 RX ADMIN — Medication 50 MILLIGRAM(S): at 05:54

## 2017-12-21 RX ADMIN — Medication 100 MILLIGRAM(S): at 11:07

## 2017-12-21 RX ADMIN — Medication 8 UNIT(S): at 12:02

## 2017-12-21 RX ADMIN — Medication 650 MILLIGRAM(S): at 07:33

## 2017-12-21 NOTE — DISCHARGE NOTE ADULT - ADDITIONAL INSTRUCTIONS
1. follow up with Dr. Cuellar on Friday 1/5/18 at 2:00 pm for your post op follow up appointment , please call the [facility] office to confirm your appointment at [phone].   2. Please schedule an appointment with your Cardiologist Dr. Ponce in 1-2 weeks, please call the office to schedule an appointment.   3. Please schedule an appointment with your PCP Dr. Lowry in 1-2 weeks, call the office to schedule an appointment. 1. follow up with Dr. Cuellar on Friday 1/5/18 at 2:00 pm for your post op follow up appointment , please call the [facility] office to confirm your appointment at [phone].   2. Please schedule an appointment with your Cardiologist Dr. Frye on 12/27 at 12:30pm for your post op follow up, please call the office to confirm your appointment.   3. Please schedule an appointment with your PCP Dr. Lowry in 1-2 weeks, call the office to schedule an appointment.

## 2017-12-21 NOTE — DISCHARGE NOTE ADULT - HOSPITAL COURSE
67M with pmh of HTN, HLD, DM who presented to cardiologist office with complaints of exertional dyspnea and exertional CP.  He underwent nuclear stress test revealing severe anterolateral wall ischemia and was subsequently admitted to [facility] for CATH which showed diffuse multi vessel coronary disease.   On 12/16/17 s/p CABG x 2 LIMA / EVH   Post-operative course:  12/17: Transferred to step down  D/c chest tube today  Post op Bifascicular block --> EP following; no PPM warranted at this time   BB initiated --> tolerating well   12/18 Transferred to Floor 2 [facility]   12/19/17 - CXR this am shows small right apical ptx - patient asymptomatic - O2 3L n/c placed on pt.  will ck CXR later- Lantus dose adjusted - per house endo  d/c pw's as per Dr. Cuellar  Disposition: plan home in am if ptx stable 12/20 12/20 PA/ [place] CXR negative for PTX   12/21 [facility] --> Patient discharged home with PT 67M with pmh of HTN, HLD, DM who presented to cardiologist office with complaints of exertional dyspnea and exertional CP.  He underwent nuclear stress test revealing severe anterolateral wall ischemia and was subsequently admitted to [facility] for CATH which showed diffuse multi vessel coronary disease.   On 12/16/17 s/p CABG x 2 LIMA / EVH   Post-operative course:  12/17: Transferred to step down  D/c chest tube today  Post op Bifascicular block --> EP following; no PPM warranted at this time   BB initiated --> tolerating well   12/18 Transferred to Floor 2 [facility]   12/19/17 - CXR this am shows small right apical ptx - patient asymptomatic - O2 3L n/c placed on pt.  will ck CXR later- Lantus dose adjusted - per house endo  d/c pw's as per Dr. Cuellar  Disposition: plan home in am if ptx stable 12/20 12/20 PA/ [place] CXR negative for PTX   12/21 VVS --> Patient discharged home with PT     Spent more than 30 min with patient. 67M with pmh of HTN, HLD, DM who presented to cardiologist office with complaints of exertional dyspnea and exertional CP.  He underwent nuclear stress test revealing severe anterolateral wall ischemia and was subsequently admitted to [facility] for CATH which showed diffuse multi vessel coronary disease.   On 12/16/17 s/p CABG x 2 LIMA / EVH   Post-operative course:  12/17: Transferred to step down  D/c chest tube today  Post op Bifascicular block --> EP following; no PPM warranted at this time   BB initiated --> tolerating well   12/18 Transferred to Floor 2 [facility]   12/19/17 - CXR this am shows small right apical ptx - patient asymptomatic - O2 3L n/c placed on pt.  will ck CXR later- Lantus dose adjusted - per house endo  d/c pw's as per Dr. Cuellar  Disposition: plan home in am if ptx stable 12/20 12/20 PA/ [place] CXR negative for PTX   12/21 VVS --> Patient discharged home with PT; ACE inh not resumed 2/2 pedal edema and ; to be resumed out patient     Spent more than 30 min with patient.

## 2017-12-21 NOTE — DISCHARGE NOTE ADULT - CARE PROVIDER_API CALL
Andre Cuellar), Surgery; Thoracic and Cardiac Surgery  [address] [place] [address]  Phone: [phone]  Fax: [phone]    Chelsea Ponce), Cardiovascular Disease; Interventional Cardiology  [address] [place] [address] [address]  [place] [address]  Phone: [phone]  Fax: [phone]    Eboni Lowry (KATHARINE), Family Medicine  [address]  [address] [place] [address]  Phone: [phone]  Fax: [phone]

## 2017-12-21 NOTE — DISCHARGE NOTE ADULT - CARE PROVIDERS DIRECT ADDRESSES
[email or web address],DirectAddress_Unknown,DirectAddress_Unknown

## 2017-12-21 NOTE — DISCHARGE NOTE ADULT - PLAN OF CARE
Recovery 1. Daily Shower  2. Weight yourself daily and notify any weight gain greater than 2-3 pounds in 24 hours.  3. Regular diet - low fat, low cholesterol, no added salt.  4. Cleanse Midsternal incision and leg incision daily while showering with warm water and mild soap, pat dry and maintain open to air.   5. Follow Cardiac Surgery Do's and Don'ts discharge instructions.   6. No driving until cleared by MD.   7. No heavy lifting nothing greater than 5 pounds until cleared by MD.   8. Call / Notify MD any fever greater than 101.0  9. Increase Activity as tolerated.

## 2017-12-21 NOTE — DISCHARGE NOTE ADULT - CARE PLAN
Principal Discharge DX:	S/P CABG x 2  Goal:	Recovery  Instructions for follow-up, activity and diet:	1. Daily Shower  2. Weight yourself daily and notify any weight gain greater than 2-3 pounds in 24 hours.  3. Regular diet - low fat, low cholesterol, no added salt.  4. Cleanse Midsternal incision and leg incision daily while showering with warm water and mild soap, pat dry and maintain open to air.   5. Follow Cardiac Surgery Do's and Don'ts discharge instructions.   6. No driving until cleared by MD.   7. No heavy lifting nothing greater than 5 pounds until cleared by MD.   8. Call / Notify MD any fever greater than 101.0  9. Increase Activity as tolerated.

## 2017-12-21 NOTE — PROGRESS NOTE ADULT - SUBJECTIVE AND OBJECTIVE BOX
Subjective: no chest pain.  no dyspnea.  NAD  	  MEDICATIONS:  MEDICATIONS  (STANDING):  aspirin enteric coated 325 milliGRAM(s) Oral daily  atorvastatin 80 milliGRAM(s) Oral at bedtime  dextrose 5%. 1000 milliLiter(s) (50 mL/Hr) IV Continuous <Continuous>  dextrose 50% Injectable 12.5 Gram(s) IV Push once  dextrose 50% Injectable 25 Gram(s) IV Push once  dextrose 50% Injectable 25 Gram(s) IV Push once  docusate sodium 100 milliGRAM(s) Oral three times a day  enoxaparin Injectable 40 milliGRAM(s) SubCutaneous daily  insulin glargine Injectable (LANTUS) 20 Unit(s) SubCutaneous at bedtime  insulin lispro (HumaLOG) corrective regimen sliding scale   SubCutaneous three times a day before meals  insulin lispro (HumaLOG) corrective regimen sliding scale   SubCutaneous at bedtime  insulin lispro Injectable (HumaLOG) 8 Unit(s) SubCutaneous three times a day before meals  metoprolol     tartrate 50 milliGRAM(s) Oral two times a day  polyethylene glycol 3350 17 Gram(s) Oral daily  sodium chloride 0.9%. 1000 milliLiter(s) (10 mL/Hr) IV Continuous <Continuous>      LABS:	 	    CARDIAC MARKERS:                                13.3   10.0  )-----------( 272      ( 21 Dec 2017 05:25 )             39.5     Hemoglobin: 13.3 g/dL (12-21 @ 05:25)  Hemoglobin: 12.8 g/dL (12-20 @ 06:09)  Hemoglobin: 12.8 g/dL (12-19 @ 06:10)  Hemoglobin: 11.8 g/dL (12-18 @ 03:16)  Hemoglobin: 12.1 g/dL (12-17 @ 01:50)      12-21    136  |  102  |  15  ----------------------------<  84  4.2   |  22  |  0.73    Ca    8.7      21 Dec 2017 05:25    TPro  5.9<L>  /  Alb  3.3  /  TBili  0.7  /  DBili  x   /  AST  30  /  ALT  49<H>  /  AlkPhos  41  12-20    Creatinine Trend: 0.73<--, 0.69<--, 0.65<--, 0.71<--, 0.75<--, 0.78<--    COAGS:       proBNP:   Lipid Profile:   HgA1c:   TSH:       PHYSICAL EXAM:  T(C): 36.8 (12-21-17 @ 12:36), Max: 36.8 (12-21-17 @ 12:36)  HR: 75 (12-21-17 @ 12:36) (75 - 78)  BP: 120/80 (12-21-17 @ 12:36) (116/73 - 120/80)  RR: 18 (12-21-17 @ 12:36) (18 - 18)  SpO2: 95% (12-21-17 @ 12:36) (95% - 96%)  Wt(kg): --  I&O's Summary    20 Dec 2017 07:01  -  21 Dec 2017 07:00  --------------------------------------------------------  IN: 1080 mL / OUT: 1450 mL / NET: -370 mL    21 Dec 2017 07:01  -  21 Dec 2017 17:55  --------------------------------------------------------  IN: 600 mL / OUT: 225 mL / NET: 375 mL          HEENT:   Normal oral mucosa, PERRL, EOMI	  Lymphatic: No obvious lymphadenopathy , no edema  Cardiovascular: Normal S1 S2, No JVD, 1/6 ADRIANA murmur, Peripheral pulses palpable 2+ bilaterally  Respiratory: Lungs clear to auscultation, normal effort 	  Gastrointestinal:  Soft, Non-tender, + BS	  Skin: No rashes,  No cyanosis, warm to touch  Musculoskeletal: Normal range of motion, normal strength  Psychiatry:  Appropriate Mood & affect     TELEMETRY: 	NSR    	      ASSESSMENT/PLAN: 	67y Male with pmhx of cad/dm/hyperlipidemia now s/p CABG  1) pain control  2) cont BB/ASA/statin  3) f/u with CTS as outpt  4) dc planning--can follow up as oupt

## 2017-12-21 NOTE — DISCHARGE NOTE ADULT - MEDICATION SUMMARY - MEDICATIONS TO STOP TAKING
I will STOP taking the medications listed below when I get home from the hospital:    enalapril 10 mg oral tablet  -- 1 tab(s) by mouth once a day

## 2017-12-21 NOTE — DISCHARGE NOTE ADULT - PATIENT PORTAL LINK FT
“You can access the [facility] Patient Portal, offered by [facility], by registering with the following website: [email or web address]”

## 2017-12-21 NOTE — DISCHARGE NOTE ADULT - OTHER SIGNIFICANT FINDINGS
Subjective: "I feel good"    General: WN/WD NAD  Neurology: A&Ox3, nonfocal, LIMA x 4  Head:  Normocephalic, atraumatic  ENT:  Mucosa moist, no ulcerations  Neck:  Supple, no sinuses or palpable masses  Lymphatic:  No palpable cervical, supraclavicular, axillary or inguinal adenopathy  Respiratory: CTA B/L  CV: RRR, S1S2, no murmur  Abdominal: Soft, NT, ND no palpable mass  MSK: No edema, + peripheral pulses, FROM all 4 extremity  Incisions: intact, no erythema or drainage

## 2017-12-21 NOTE — DISCHARGE NOTE ADULT - NS AS ACTIVITY OBS
No Heavy lifting/straining/Do not make important decisions/Do not drive or operate machinery/Showering allowed/Walking-Outdoors allowed/Stairs allowed/Walking-Indoors allowed

## 2017-12-21 NOTE — PROGRESS NOTE ADULT - SUBJECTIVE AND OBJECTIVE BOX
EP     tele: NSR, first degree AV delay, no significant events    no palpitations, no syncope, no angina      MEDICATIONS  (STANDING):  aspirin enteric coated 325 milliGRAM(s) Oral daily  atorvastatin 80 milliGRAM(s) Oral at bedtime  dextrose 5%. 1000 milliLiter(s) (50 mL/Hr) IV Continuous <Continuous>  dextrose 50% Injectable 12.5 Gram(s) IV Push once  dextrose 50% Injectable 25 Gram(s) IV Push once  dextrose 50% Injectable 25 Gram(s) IV Push once  docusate sodium 100 milliGRAM(s) Oral three times a day  enoxaparin Injectable 40 milliGRAM(s) SubCutaneous daily  insulin glargine Injectable (LANTUS) 20 Unit(s) SubCutaneous at bedtime  insulin lispro (HumaLOG) corrective regimen sliding scale   SubCutaneous three times a day before meals  insulin lispro (HumaLOG) corrective regimen sliding scale   SubCutaneous at bedtime  insulin lispro Injectable (HumaLOG) 8 Unit(s) SubCutaneous three times a day before meals  metoprolol     tartrate 50 milliGRAM(s) Oral two times a day  polyethylene glycol 3350 17 Gram(s) Oral daily  sodium chloride 0.9%. 1000 milliLiter(s) (10 mL/Hr) IV Continuous <Continuous>    MEDICATIONS  (PRN):  acetaminophen   Tablet. 650 milliGRAM(s) Oral every 6 hours PRN Mild Pain (1 - 3)  dextrose Gel 1 Dose(s) Oral once PRN Blood Glucose LESS THAN 70 milliGRAM(s)/deciLiter  glucagon  Injectable 1 milliGRAM(s) IntraMuscular once PRN Glucose <70 milliGRAM(s)/deciLiter  oxyCODONE    IR 5 milliGRAM(s) Oral every 4 hours PRN Moderate Pain (4 - 6)      LABS:                        13.3   10.0  )-----------( 272      ( 21 Dec 2017 05:25 )             39.5     Hemoglobin: 13.3 g/dL (12-21 @ 05:25)  Hemoglobin: 12.8 g/dL (12-20 @ 06:09)  Hemoglobin: 12.8 g/dL (12-19 @ 06:10)  Hemoglobin: 11.8 g/dL (12-18 @ 03:16)  Hemoglobin: 12.1 g/dL (12-17 @ 01:50)    12-21    136  |  102  |  15  ----------------------------<  84  4.2   |  22  |  0.73    Ca    8.7      21 Dec 2017 05:25    TPro  5.9<L>  /  Alb  3.3  /  TBili  0.7  /  DBili  x   /  AST  30  /  ALT  49<H>  /  AlkPhos  41  12-20    Creatinine Trend: 0.73<--, 0.69<--, 0.65<--, 0.71<--, 0.75<--, 0.78<--           PHYSICAL EXAM  Vital Signs Last 24 Hrs  T(C): 36.8 (21 Dec 2017 12:36), Max: 36.8 (21 Dec 2017 12:36)  T(F): 98.2 (21 Dec 2017 12:36), Max: 98.2 (21 Dec 2017 12:36)  HR: 75 (21 Dec 2017 12:36) (75 - 78)  BP: 120/80 (21 Dec 2017 12:36) (116/73 - 120/80)  BP(mean): --  RR: 18 (21 Dec 2017 12:36) (18 - 18)  SpO2: 95% (21 Dec 2017 12:36) (95% - 96%)    no JVD  RRR, no murmurs  CTAB  soft nt/nd  no c/c/e    office echo: normal LVEF      A/P) 67 year old male PMH HTN, HLD, DM admitted with dyspnea on exertion. Found to have LM disease now s/p CABG. EP called for bifascicular block. No syncope, presyncope, nor heart block     -given the above there is no indication for PPM at this time  -no contraindication to beta blockers for CAD  -supportive care as per CTS  -f/u with Dr Frye 12/27 @1230pm    Shannon Ferrell [facility]  O:  [phone]  P: [phone]

## 2017-12-21 NOTE — PROGRESS NOTE ADULT - ATTENDING COMMENTS
Agree with above assessment and plan as outlined above.    - Cont supportive care per CTICU    Piotr Scruggs MD, FACC  [facility], [facility]  [address] [place] [address]  PHONE:  [phone]  BEEPER : [phone]
EP ATTENDING    Agree with above. No indication for PPM at this time. D/C planning as per CTS. F/U with Dr. Ponce after discharge and recommend outpatient event monitoring.
patient seen/examined  s/p CABG  pain control  plan as per cts
patient seen/examined.  s/p cabg  pain control  dietary modification discussed  plan as per cts

## 2017-12-21 NOTE — PROGRESS NOTE ADULT - SUBJECTIVE AND OBJECTIVE BOX
Patient denies CP, SOB, ROS (-)    acetaminophen   Tablet. 650 milliGRAM(s) Oral every 6 hours PRN  aspirin enteric coated 325 milliGRAM(s) Oral daily  atorvastatin 80 milliGRAM(s) Oral at bedtime  dextrose 5%. 1000 milliLiter(s) IV Continuous <Continuous>  dextrose 50% Injectable 12.5 Gram(s) IV Push once  dextrose 50% Injectable 25 Gram(s) IV Push once  dextrose 50% Injectable 25 Gram(s) IV Push once  dextrose Gel 1 Dose(s) Oral once PRN  docusate sodium 100 milliGRAM(s) Oral three times a day  enoxaparin Injectable 40 milliGRAM(s) SubCutaneous daily  glucagon  Injectable 1 milliGRAM(s) IntraMuscular once PRN  insulin glargine Injectable (LANTUS) 20 Unit(s) SubCutaneous at bedtime  insulin lispro (HumaLOG) corrective regimen sliding scale   SubCutaneous three times a day before meals  insulin lispro (HumaLOG) corrective regimen sliding scale   SubCutaneous at bedtime  insulin lispro Injectable (HumaLOG) 8 Unit(s) SubCutaneous three times a day before meals  metoprolol     tartrate 50 milliGRAM(s) Oral two times a day  oxyCODONE    IR 5 milliGRAM(s) Oral every 4 hours PRN  polyethylene glycol 3350 17 Gram(s) Oral daily  sodium chloride 0.9%. 1000 milliLiter(s) IV Continuous <Continuous>                            13.3   10.0  )-----------( 272      ( 21 Dec 2017 05:25 )             39.5       Hemoglobin: 13.3 g/dL (12-21 @ 05:25)  Hemoglobin: 12.8 g/dL (12-20 @ 06:09)  Hemoglobin: 12.8 g/dL (12-19 @ 06:10)  Hemoglobin: 11.8 g/dL (12-18 @ 03:16)  Hemoglobin: 12.1 g/dL (12-17 @ 01:50)      12-21    136  |  102  |  15  ----------------------------<  84  4.2   |  22  |  0.73    Ca    8.7      21 Dec 2017 05:25    TPro  5.9<L>  /  Alb  3.3  /  TBili  0.7  /  DBili  x   /  AST  30  /  ALT  49<H>  /  AlkPhos  41  12-20    Creatinine Trend: 0.73<--, 0.69<--, 0.65<--, 0.71<--, 0.75<--, 0.78<--    COAGS:           T(C): 36.8 (12-21-17 @ 12:36), Max: 36.8 (12-21-17 @ 12:36)  HR: 75 (12-21-17 @ 12:36) (75 - 78)  BP: 120/80 (12-21-17 @ 12:36) (116/73 - 120/80)  RR: 18 (12-21-17 @ 12:36) (18 - 18)  SpO2: 95% (12-21-17 @ 12:36) (95% - 96%)  Wt(kg): --    I&O's Summary    20 Dec 2017 07:01  -  21 Dec 2017 07:00  --------------------------------------------------------  IN: 1080 mL / OUT: 1450 mL / NET: -370 mL    21 Dec 2017 07:01  -  21 Dec 2017 14:37  --------------------------------------------------------  IN: 600 mL / OUT: 225 mL / NET: 375 mL            Heart: normal S1, S2, RRR, no m/r/g  Lungs: cta b/l  Abd: soft nT, nD  Ext: no edema     TELEMETRY: SR    DIAGNOSTIC TESTING:  [ ] Echocardiogram:  intra op - KAE- NL lv fx  [ ]  Catheterization: < from: Cardiac Cath Lab - Adult (12.15.17 @ 16:43) >  LM:   --  Ostial LM: There was a discrete 95 % stenosis.  LAD:   --  Ostial LAD: The vessel was heavily calcified. There was a 99 %  stenosis. There was PERLITA grade 2 flow through the vessel (partial  perfusion).  --  Proximal LAD: Angiography showed mild atherosclerosis with no flow  limiting lesions.  --  Mid LAD: There was a tubular 50 % stenosis.  --  Distal LAD: Angiography showed mild atherosclerosis with no flow  limiting lesions.  --  D1: Angiography showed mild atherosclerosis with no flow limiting  lesions.  CX:   --  Proximal circumflex: Angiography showed minor luminal  irregularities with no flow limiting lesions.  --  Mid circumflex: Angiography showed minor luminal irregularities with no  flow limiting lesions.  --  Distal circumflex: Angiography showed mild atherosclerosis with no flow  limiting lesions.  --  OM1: Angiography showed mild atherosclerosis with no flow limiting  lesions.  --  OM2: Angiography showed minor luminal irregularities with no flow  limiting lesions.  RCA:   --  Proximal RCA: Angiography showed mild atherosclerosis with no  flow limiting lesions.  --  Mid RCA: There was a discrete 50 % stenosis. In a second lesion, there  was a discrete 20 % stenosis.  --  Distal RCA: Angiography showed minor luminal irregularities with no  flow limiting lesions.    < end of copied text >    OTHER: 	        ASSESSMENT/PLAN: 	67y Male hx of dm, htn, chol, obesity admitted with new onset angina found to have severe LM disease now s/p CABG X 2 with right PTX s/p chest tube removal :   -Progressing well  -continue with asa/statin/beta blockers  -f/u cts  -D/C plan for today      Piotr Scruggs MD, FACC  [facility], [facility]  [address] [place] [address]  PHONE:  [phone]  BEEPER : [phone]

## 2017-12-21 NOTE — DISCHARGE NOTE ADULT - MEDICATION SUMMARY - MEDICATIONS TO TAKE
I will START or STAY ON the medications listed below when I get home from the hospital:    acetaminophen 325 mg oral tablet  -- 2 tab(s) by mouth every 6 hours, As needed, Mild Pain (1 - 3)  -- Indication: For As needed for Pain     oxyCODONE 5 mg oral tablet  -- 1 tab(s) by mouth every 4 hours, As needed, Moderate Pain (4 - 6) MDD:6 tabs  -- Indication: For As needed for severe Pain     aspirin 325 mg oral delayed release tablet  -- 1 tab(s) by mouth once a day  -- Indication: For Blood thinner     Tresiba FlexTouch 100 units/mL subcutaneous solution  -- 15 unit(s) subcutaneous once a day (at bedtime)  -- Indication: For Diabetes     Jardiance 25 mg oral tablet  -- 1 tab(s) by mouth once a day (in the morning)  -- Indication: For Diabetes    metFORMIN 1000 mg oral tablet  -- 1 tab(s) by mouth once a day  -- Indication: For Diabetes     atorvastatin 80 mg oral tablet  -- 1 tab(s) by mouth once a day (at bedtime)  -- Indication: For Cholesterol     metoprolol tartrate 50 mg oral tablet  -- 1 tab(s) by mouth 2 times a day  -- Indication: For Blood pressure and heart rate control     docusate sodium 100 mg oral capsule  -- 1 cap(s) by mouth 3 times a day, As Needed -for constipation   -- Indication: For Stool Softner     polyethylene glycol 3350 oral powder for reconstitution  -- 17 gram(s) by mouth once a day, As Needed -for constipation   -- Indication: For Stool Softner / Laxative

## 2018-01-02 ENCOUNTER — RECORD ABSTRACTING (OUTPATIENT)
Age: 68
End: 2018-01-02

## 2018-01-02 DIAGNOSIS — Z78.9 OTHER SPECIFIED HEALTH STATUS: ICD-10-CM

## 2018-01-02 DIAGNOSIS — Z82.49 FAMILY HISTORY OF ISCHEMIC HEART DISEASE AND OTHER DISEASES OF THE CIRCULATORY SYSTEM: ICD-10-CM

## 2018-01-02 DIAGNOSIS — Z86.79 PERSONAL HISTORY OF OTHER DISEASES OF THE CIRCULATORY SYSTEM: ICD-10-CM

## 2018-01-02 DIAGNOSIS — E11.9 TYPE 2 DIABETES MELLITUS W/OUT COMPLICATIONS: ICD-10-CM

## 2018-01-02 DIAGNOSIS — I10 ESSENTIAL (PRIMARY) HYPERTENSION: ICD-10-CM

## 2018-01-02 DIAGNOSIS — E78.5 HYPERLIPIDEMIA, UNSPECIFIED: ICD-10-CM

## 2018-01-02 RX ORDER — EMPAGLIFLOZIN 25 MG/1
25 TABLET, FILM COATED ORAL DAILY
Refills: 0 | Status: ACTIVE | COMMUNITY

## 2018-01-02 RX ORDER — METOPROLOL TARTRATE 50 MG/1
50 TABLET, FILM COATED ORAL TWICE DAILY
Refills: 0 | Status: ACTIVE | COMMUNITY

## 2018-01-02 RX ORDER — METFORMIN HYDROCHLORIDE 1000 MG/1
1000 TABLET, COATED ORAL DAILY
Refills: 0 | Status: ACTIVE | COMMUNITY

## 2018-01-02 RX ORDER — INSULIN DEGLUDEC INJECTION 100 U/ML
100 INJECTION, SOLUTION SUBCUTANEOUS
Refills: 0 | Status: ACTIVE | COMMUNITY

## 2018-01-02 RX ORDER — DOCUSATE SODIUM 100 MG/1
100 CAPSULE, LIQUID FILLED ORAL 3 TIMES DAILY
Refills: 0 | Status: ACTIVE | COMMUNITY

## 2018-01-02 RX ORDER — ATORVASTATIN CALCIUM 80 MG/1
80 TABLET, FILM COATED ORAL DAILY
Refills: 0 | Status: ACTIVE | COMMUNITY

## 2018-01-02 RX ORDER — ASPIRIN 325 MG/1
325 TABLET, FILM COATED ORAL DAILY
Refills: 0 | Status: ACTIVE | COMMUNITY

## 2018-01-05 ENCOUNTER — APPOINTMENT (OUTPATIENT)
Dept: CARDIOTHORACIC SURGERY | Facility: CLINIC | Age: 68
End: 2018-01-05
Payer: MEDICARE

## 2018-01-05 VITALS
TEMPERATURE: 98.1 F | BODY MASS INDEX: 31.18 KG/M2 | HEIGHT: 66 IN | HEART RATE: 60 BPM | RESPIRATION RATE: 13 BRPM | SYSTOLIC BLOOD PRESSURE: 122 MMHG | DIASTOLIC BLOOD PRESSURE: 72 MMHG | WEIGHT: 194 LBS | OXYGEN SATURATION: 99 %

## 2018-01-05 DIAGNOSIS — Z95.1 PRESENCE OF AORTOCORONARY BYPASS GRAFT: ICD-10-CM

## 2018-01-05 DIAGNOSIS — Z09 ENCOUNTER FOR FOLLOW-UP EXAMINATION AFTER COMPLETED TREATMENT FOR CONDITIONS OTHER THAN MALIGNANT NEOPLASM: ICD-10-CM

## 2018-01-05 PROCEDURE — 99024 POSTOP FOLLOW-UP VISIT: CPT

## 2018-01-05 RX ORDER — ACETAMINOPHEN 325 MG/1
325 TABLET ORAL EVERY 6 HOURS
Refills: 0 | Status: ACTIVE | COMMUNITY
Start: 2018-01-05

## 2018-01-05 RX ORDER — OXYCODONE 5 MG/1
5 TABLET ORAL
Refills: 0 | Status: COMPLETED | COMMUNITY
End: 2018-01-05

## 2018-01-05 RX ORDER — BRIMONIDINE TARTRATE, TIMOLOL MALEATE 2; 5 MG/ML; MG/ML
0.2-0.5 SOLUTION/ DROPS OPHTHALMIC
Qty: 5 | Refills: 0 | Status: ACTIVE | COMMUNITY
Start: 2017-07-08

## 2018-01-05 RX ORDER — LORATADINE 10 MG
17 TABLET,DISINTEGRATING ORAL
Refills: 0 | Status: ACTIVE | COMMUNITY

## 2019-04-12 ENCOUNTER — OUTPATIENT (OUTPATIENT)
Dept: OUTPATIENT SERVICES | Facility: HOSPITAL | Age: 69
LOS: 1 days | End: 2019-04-12
Payer: COMMERCIAL

## 2019-04-12 VITALS
RESPIRATION RATE: 18 BRPM | SYSTOLIC BLOOD PRESSURE: 128 MMHG | TEMPERATURE: 98 F | WEIGHT: 209 LBS | HEART RATE: 59 BPM | OXYGEN SATURATION: 96 % | DIASTOLIC BLOOD PRESSURE: 80 MMHG | HEIGHT: 66 IN

## 2019-04-12 DIAGNOSIS — Z98.890 OTHER SPECIFIED POSTPROCEDURAL STATES: Chronic | ICD-10-CM

## 2019-04-12 DIAGNOSIS — Z95.1 PRESENCE OF AORTOCORONARY BYPASS GRAFT: Chronic | ICD-10-CM

## 2019-04-12 DIAGNOSIS — I25.10 ATHEROSCLEROTIC HEART DISEASE OF NATIVE CORONARY ARTERY WITHOUT ANGINA PECTORIS: ICD-10-CM

## 2019-04-12 LAB
ALBUMIN SERPL ELPH-MCNC: 4.4 G/DL — SIGNIFICANT CHANGE UP (ref 3.3–5)
ALP SERPL-CCNC: 61 U/L — SIGNIFICANT CHANGE UP (ref 40–120)
ALT FLD-CCNC: 46 U/L — HIGH (ref 10–45)
ANION GAP SERPL CALC-SCNC: 10 MMOL/L — SIGNIFICANT CHANGE UP (ref 5–17)
AST SERPL-CCNC: 34 U/L — SIGNIFICANT CHANGE UP (ref 10–40)
BILIRUB SERPL-MCNC: 0.5 MG/DL — SIGNIFICANT CHANGE UP (ref 0.2–1.2)
BUN SERPL-MCNC: 11 MG/DL — SIGNIFICANT CHANGE UP (ref 7–23)
CALCIUM SERPL-MCNC: 9.4 MG/DL — SIGNIFICANT CHANGE UP (ref 8.4–10.5)
CHLORIDE SERPL-SCNC: 100 MMOL/L — SIGNIFICANT CHANGE UP (ref 96–108)
CO2 SERPL-SCNC: 25 MMOL/L — SIGNIFICANT CHANGE UP (ref 22–31)
CREAT SERPL-MCNC: 0.75 MG/DL — SIGNIFICANT CHANGE UP (ref 0.5–1.3)
GLUCOSE BLDC GLUCOMTR-MCNC: 80 MG/DL — SIGNIFICANT CHANGE UP (ref 70–99)
GLUCOSE SERPL-MCNC: 88 MG/DL — SIGNIFICANT CHANGE UP (ref 70–99)
HCT VFR BLD CALC: 44.5 % — SIGNIFICANT CHANGE UP (ref 39–50)
HGB BLD-MCNC: 15.1 G/DL — SIGNIFICANT CHANGE UP (ref 13–17)
MCHC RBC-ENTMCNC: 30.6 PG — SIGNIFICANT CHANGE UP (ref 27–34)
MCHC RBC-ENTMCNC: 33.9 GM/DL — SIGNIFICANT CHANGE UP (ref 32–36)
MCV RBC AUTO: 90.1 FL — SIGNIFICANT CHANGE UP (ref 80–100)
PLATELET # BLD AUTO: 227 K/UL — SIGNIFICANT CHANGE UP (ref 150–400)
POTASSIUM SERPL-MCNC: 4.6 MMOL/L — SIGNIFICANT CHANGE UP (ref 3.5–5.3)
POTASSIUM SERPL-SCNC: 4.6 MMOL/L — SIGNIFICANT CHANGE UP (ref 3.5–5.3)
PROT SERPL-MCNC: 7.4 G/DL — SIGNIFICANT CHANGE UP (ref 6–8.3)
RBC # BLD: 4.93 M/UL — SIGNIFICANT CHANGE UP (ref 4.2–5.8)
RBC # FLD: 12.9 % — SIGNIFICANT CHANGE UP (ref 10.3–14.5)
SODIUM SERPL-SCNC: 135 MMOL/L — SIGNIFICANT CHANGE UP (ref 135–145)
WBC # BLD: 8.4 K/UL — SIGNIFICANT CHANGE UP (ref 3.8–10.5)
WBC # FLD AUTO: 8.4 K/UL — SIGNIFICANT CHANGE UP (ref 3.8–10.5)

## 2019-04-12 PROCEDURE — C1894: CPT

## 2019-04-12 PROCEDURE — 93010 ELECTROCARDIOGRAM REPORT: CPT

## 2019-04-12 PROCEDURE — 99152 MOD SED SAME PHYS/QHP 5/>YRS: CPT

## 2019-04-12 PROCEDURE — 99152 MOD SED SAME PHYS/QHP 5/>YRS: CPT | Mod: GC

## 2019-04-12 PROCEDURE — C1769: CPT

## 2019-04-12 PROCEDURE — 93455 CORONARY ART/GRFT ANGIO S&I: CPT

## 2019-04-12 PROCEDURE — 99153 MOD SED SAME PHYS/QHP EA: CPT

## 2019-04-12 PROCEDURE — 93005 ELECTROCARDIOGRAM TRACING: CPT

## 2019-04-12 PROCEDURE — 82962 GLUCOSE BLOOD TEST: CPT

## 2019-04-12 PROCEDURE — C1887: CPT

## 2019-04-12 PROCEDURE — 93455 CORONARY ART/GRFT ANGIO S&I: CPT | Mod: 26,GC

## 2019-04-12 PROCEDURE — 80053 COMPREHEN METABOLIC PANEL: CPT

## 2019-04-12 PROCEDURE — 85027 COMPLETE CBC AUTOMATED: CPT

## 2019-04-12 RX ORDER — ATORVASTATIN CALCIUM 80 MG/1
1 TABLET, FILM COATED ORAL
Qty: 0 | Refills: 0 | COMMUNITY

## 2019-04-12 RX ORDER — METFORMIN HYDROCHLORIDE 850 MG/1
1 TABLET ORAL
Qty: 0 | Refills: 0 | COMMUNITY

## 2019-04-12 RX ORDER — ASPIRIN/CALCIUM CARB/MAGNESIUM 324 MG
1 TABLET ORAL
Qty: 0 | Refills: 0 | COMMUNITY

## 2019-04-12 RX ORDER — METOPROLOL TARTRATE 50 MG
1 TABLET ORAL
Qty: 0 | Refills: 0 | COMMUNITY

## 2019-04-12 NOTE — H&P CARDIOLOGY - HISTORY OF PRESENT ILLNESS
69 y/o [place] male with PMHx of HTN, HLD, T2DM and CAD s/p CABG x 3 in 12/2017 who was evaluated by his Cardiolgist - Dr. White in 3/2019 and referred for Nuclear Stress Test for CAD surveillance which he underwent on 3/15/2019 showing a large area of moderate ischemia in the apical and mid anterior wall, LVEF 70%.  Patient is now for [facility] for which he presents today.  He denies and CP/dyspnea/palpitations/syncope. 69 y/o [place] male with PMHx of HTN, HLD, T2DM A1C 7.5 (a/p patient) and CAD s/p CABG x 2 in 12/2017 who was evaluated by his Cardiolgist - Dr. White in 3/2019 and referred for Nuclear Stress Test for CAD surveillance which he underwent on 3/15/2019 showing a large area of moderate ischemia in the apical and mid anterior wall, LVEF 70%.  Patient is now for [facility] for which he presents today.  He denies and CP/dyspnea/palpitations/syncope.

## 2019-04-12 NOTE — H&P CARDIOLOGY - PMH
HLD (hyperlipidemia)    HTN (hypertension)    Left main coronary artery disease    Type 2 diabetes mellitus with complication, unspecified whether long term insulin use Coronary artery disease due to lipid rich plaque    HLD (hyperlipidemia)    HTN (hypertension)    Type 2 diabetes mellitus with complication, unspecified whether long term insulin use

## 2019-04-13 PROBLEM — I25.10 ATHEROSCLEROTIC HEART DISEASE OF NATIVE CORONARY ARTERY WITHOUT ANGINA PECTORIS: Chronic | Status: INACTIVE | Noted: 2017-12-15 | Resolved: 2019-04-12

## 2019-04-13 PROBLEM — E11.9 TYPE 2 DIABETES MELLITUS WITHOUT COMPLICATIONS: Chronic | Status: INACTIVE | Noted: 2017-12-15 | Resolved: 2019-04-12

## 2020-08-14 NOTE — H&P ADULT - PROBLEM/PLAN-4
DISPLAY PLAN FREE TEXT Tazorac Counseling:  Patient advised that medication is irritating and drying.  Patient may need to apply sparingly and wash off after an hour before eventually leaving it on overnight.  The patient verbalized understanding of the proper use and possible adverse effects of tazorac.  All of the patient's questions and concerns were addressed.

## 2022-05-02 NOTE — H&P CARDIOLOGY - AS O2 DELIVERY
No holes/breaks/tears/detachment seen on detailed exam today. RD precautions discussed. Call office if worsening floaters, persistent and worsening flashes, or curtain of vision loss. room air

## 2023-05-12 ENCOUNTER — NON-APPOINTMENT (OUTPATIENT)
Age: 73
End: 2023-05-12

## 2023-08-03 NOTE — PATIENT PROFILE ADULT. - AS SC BRADEN NUTRITION
Other acne  (primary encounter diagnosis) will refer to see dermatology   Anxiety-discussed about relaxation technique do some yoga ,will refer to Rocio Stanley
Review of Systems:   CONSTITUTIONAL: No fever, weight loss  EYES: No eye pain, visual disturbances, or discharge  ENMT:  No difficulty hearing, tinnitus, vertigo; No sinus or throat pain  RESPIRATORY: No SOB. No cough, wheezing, chills or hemoptysis  CARDIOVASCULAR: No chest pain, palpitations, dizziness, or leg swelling  GASTROINTESTINAL: No abdominal or epigastric pain. No nausea, vomiting, or hematemesis; No diarrhea or constipation. No melena or hematochezia.  GENITOURINARY: No dysuria, frequency, hematuria, or incontinence  NEUROLOGICAL: No headaches, memory loss, loss of strength, numbness, or tremors  SKIN: wounds in L foot, minimal pain  MUSCULOSKELETAL: No joint pain or swelling; No muscle, back pain. +aches in LE only with prolonged activity.  PSYCHIATRIC: No depression, anxiety, mood swings, or difficulty sleeping  HEME/LYMPH: No easy bruising, or bleeding gums
(2) probably inadequate

## 2024-06-17 PROBLEM — E11.8 TYPE 2 DIABETES MELLITUS WITH UNSPECIFIED COMPLICATIONS: Chronic | Status: ACTIVE | Noted: 2019-04-12

## 2024-06-17 PROBLEM — I25.10 ATHEROSCLEROTIC HEART DISEASE OF NATIVE CORONARY ARTERY WITHOUT ANGINA PECTORIS: Chronic | Status: ACTIVE | Noted: 2019-04-12

## 2024-06-19 ENCOUNTER — NON-APPOINTMENT (OUTPATIENT)
Age: 74
End: 2024-06-19

## 2024-06-29 ENCOUNTER — NON-APPOINTMENT (OUTPATIENT)
Age: 74
End: 2024-06-29

## 2024-06-29 ENCOUNTER — APPOINTMENT (OUTPATIENT)
Dept: ORTHOPEDIC SURGERY | Facility: CLINIC | Age: 74
End: 2024-06-29
Payer: COMMERCIAL

## 2024-06-29 DIAGNOSIS — M65.341 TRIGGER FINGER, RIGHT RING FINGER: ICD-10-CM

## 2024-06-29 PROCEDURE — 99204 OFFICE O/P NEW MOD 45 MIN: CPT | Mod: 25

## 2024-06-29 PROCEDURE — 20600 DRAIN/INJ JOINT/BURSA W/O US: CPT | Mod: RT

## 2024-12-10 ENCOUNTER — NON-APPOINTMENT (OUTPATIENT)
Age: 74
End: 2024-12-10

## 2024-12-10 ENCOUNTER — APPOINTMENT (OUTPATIENT)
Dept: CARDIOLOGY | Facility: CLINIC | Age: 74
End: 2024-12-10
Payer: COMMERCIAL

## 2024-12-10 VITALS — SYSTOLIC BLOOD PRESSURE: 150 MMHG | DIASTOLIC BLOOD PRESSURE: 80 MMHG

## 2024-12-10 VITALS
BODY MASS INDEX: 31.82 KG/M2 | HEIGHT: 66 IN | DIASTOLIC BLOOD PRESSURE: 72 MMHG | HEART RATE: 68 BPM | WEIGHT: 198 LBS | OXYGEN SATURATION: 96 % | SYSTOLIC BLOOD PRESSURE: 158 MMHG

## 2024-12-10 DIAGNOSIS — I10 ESSENTIAL (PRIMARY) HYPERTENSION: ICD-10-CM

## 2024-12-10 DIAGNOSIS — E78.5 HYPERLIPIDEMIA, UNSPECIFIED: ICD-10-CM

## 2024-12-10 DIAGNOSIS — I25.10 ATHEROSCLEROTIC HEART DISEASE OF NATIVE CORONARY ARTERY W/OUT ANGINA PECTORIS: ICD-10-CM

## 2024-12-10 PROCEDURE — G2211 COMPLEX E/M VISIT ADD ON: CPT | Mod: NC

## 2024-12-10 PROCEDURE — 99204 OFFICE O/P NEW MOD 45 MIN: CPT

## 2024-12-10 PROCEDURE — 93000 ELECTROCARDIOGRAM COMPLETE: CPT

## 2024-12-11 ENCOUNTER — APPOINTMENT (OUTPATIENT)
Dept: ORTHOPEDIC SURGERY | Facility: CLINIC | Age: 74
End: 2024-12-11
Payer: COMMERCIAL

## 2024-12-11 VITALS — BODY MASS INDEX: 32.14 KG/M2 | WEIGHT: 200 LBS | HEIGHT: 66 IN

## 2024-12-11 DIAGNOSIS — M65.341 TRIGGER FINGER, RIGHT RING FINGER: ICD-10-CM

## 2024-12-11 PROCEDURE — 99203 OFFICE O/P NEW LOW 30 MIN: CPT | Mod: 25

## 2024-12-11 PROCEDURE — 20550 NJX 1 TENDON SHEATH/LIGAMENT: CPT | Mod: RT

## 2024-12-11 RX ORDER — BETAMETHA AC,SOD PHOS/WATER/PF 6 MG/ML
6 (3-3) VIAL (ML) INJECTION
Qty: 2 | Refills: 0 | Status: COMPLETED | OUTPATIENT
Start: 2024-12-11

## 2024-12-11 RX ORDER — LIDOCAINE HYDROCHLORIDE 10 MG/ML
1 INJECTION, SOLUTION INFILTRATION; PERINEURAL
Refills: 0 | Status: COMPLETED | OUTPATIENT
Start: 2024-12-11

## 2024-12-11 RX ADMIN — LIDOCAINE HYDROCHLORIDE 0.5 %: 10 INJECTION, SOLUTION INFILTRATION; PERINEURAL at 00:00

## 2024-12-11 RX ADMIN — BETAMETHASONE ACETATE AND BETAMETHASONE SODIUM PHOSPHATE 1 MG/ML: 3; 3 INJECTION, SUSPENSION INTRA-ARTICULAR; INTRALESIONAL; INTRAMUSCULAR; SOFT TISSUE at 00:00

## 2024-12-18 ENCOUNTER — APPOINTMENT (OUTPATIENT)
Dept: CARDIOLOGY | Facility: CLINIC | Age: 74
End: 2024-12-18
Payer: COMMERCIAL

## 2024-12-18 PROCEDURE — 93306 TTE W/DOPPLER COMPLETE: CPT

## 2025-01-27 ENCOUNTER — APPOINTMENT (OUTPATIENT)
Dept: CARDIOLOGY | Facility: CLINIC | Age: 75
End: 2025-01-27
Payer: COMMERCIAL

## 2025-01-27 PROCEDURE — A9500: CPT

## 2025-01-27 PROCEDURE — 93015 CV STRESS TEST SUPVJ I&R: CPT

## 2025-01-27 PROCEDURE — 78452 HT MUSCLE IMAGE SPECT MULT: CPT

## 2025-01-27 RX ORDER — INSULIN DEGLUDEC INJECTION 100 U/ML
100 INJECTION, SOLUTION SUBCUTANEOUS
Refills: 0 | Status: ACTIVE | COMMUNITY

## 2025-01-27 RX ORDER — LATANOPROST/PF 0.005 %
DROPS OPHTHALMIC (EYE) DAILY
Refills: 0 | Status: ACTIVE | COMMUNITY

## 2025-01-27 RX ORDER — ENALAPRIL MALEATE 5 MG/1
5 TABLET ORAL DAILY
Refills: 0 | Status: ACTIVE | COMMUNITY

## 2025-01-27 RX ORDER — DORZOLAMIDE HYDROCHLORIDE AND TIMOLOL MALEATE PRESERVATIVE FREE 20; 5 MG/ML; MG/ML
SOLUTION/ DROPS OPHTHALMIC TWICE DAILY
Refills: 0 | Status: ACTIVE | COMMUNITY

## 2025-01-27 RX ORDER — ROSUVASTATIN CALCIUM 10 MG/1
10 TABLET, FILM COATED ORAL
Refills: 0 | Status: ACTIVE | COMMUNITY

## 2025-01-29 ENCOUNTER — APPOINTMENT (OUTPATIENT)
Dept: CARDIOLOGY | Facility: CLINIC | Age: 75
End: 2025-01-29

## 2025-02-03 ENCOUNTER — NON-APPOINTMENT (OUTPATIENT)
Age: 75
End: 2025-02-03

## 2025-02-03 DIAGNOSIS — R94.39 ABNORMAL RESULT OF OTHER CARDIOVASCULAR FUNCTION STUDY: ICD-10-CM

## 2025-02-03 DIAGNOSIS — Z95.1 PRESENCE OF AORTOCORONARY BYPASS GRAFT: ICD-10-CM

## 2025-02-03 DIAGNOSIS — I25.10 ATHEROSCLEROTIC HEART DISEASE OF NATIVE CORONARY ARTERY W/OUT ANGINA PECTORIS: ICD-10-CM

## 2025-04-15 ENCOUNTER — TRANSCRIPTION ENCOUNTER (OUTPATIENT)
Age: 75
End: 2025-04-15

## 2025-04-15 ENCOUNTER — OUTPATIENT (OUTPATIENT)
Dept: OUTPATIENT SERVICES | Facility: HOSPITAL | Age: 75
LOS: 1 days | End: 2025-04-15
Payer: COMMERCIAL

## 2025-04-15 VITALS
RESPIRATION RATE: 16 BRPM | OXYGEN SATURATION: 97 % | TEMPERATURE: 98 F | DIASTOLIC BLOOD PRESSURE: 77 MMHG | HEIGHT: 66 IN | SYSTOLIC BLOOD PRESSURE: 178 MMHG | HEART RATE: 72 BPM | WEIGHT: 216.93 LBS

## 2025-04-15 VITALS
OXYGEN SATURATION: 99 % | SYSTOLIC BLOOD PRESSURE: 144 MMHG | TEMPERATURE: 98 F | HEART RATE: 72 BPM | DIASTOLIC BLOOD PRESSURE: 68 MMHG

## 2025-04-15 DIAGNOSIS — Z95.1 PRESENCE OF AORTOCORONARY BYPASS GRAFT: Chronic | ICD-10-CM

## 2025-04-15 DIAGNOSIS — R94.39 ABNORMAL RESULT OF OTHER CARDIOVASCULAR FUNCTION STUDY: ICD-10-CM

## 2025-04-15 LAB
ANION GAP SERPL CALC-SCNC: 15 MMOL/L — SIGNIFICANT CHANGE UP (ref 5–17)
BUN SERPL-MCNC: 13 MG/DL — SIGNIFICANT CHANGE UP (ref 7–23)
CALCIUM SERPL-MCNC: 9.4 MG/DL — SIGNIFICANT CHANGE UP (ref 8.4–10.5)
CHLORIDE SERPL-SCNC: 100 MMOL/L — SIGNIFICANT CHANGE UP (ref 96–108)
CO2 SERPL-SCNC: 20 MMOL/L — LOW (ref 22–31)
CREAT SERPL-MCNC: 0.71 MG/DL — SIGNIFICANT CHANGE UP (ref 0.5–1.3)
EGFR: 96 ML/MIN/1.73M2 — SIGNIFICANT CHANGE UP
EGFR: 96 ML/MIN/1.73M2 — SIGNIFICANT CHANGE UP
GLUCOSE BLDC GLUCOMTR-MCNC: 97 MG/DL — SIGNIFICANT CHANGE UP (ref 70–99)
GLUCOSE BLDC GLUCOMTR-MCNC: 98 MG/DL — SIGNIFICANT CHANGE UP (ref 70–99)
GLUCOSE SERPL-MCNC: 131 MG/DL — HIGH (ref 70–99)
HCT VFR BLD CALC: 43.6 % — SIGNIFICANT CHANGE UP (ref 39–50)
HGB BLD-MCNC: 14.3 G/DL — SIGNIFICANT CHANGE UP (ref 13–17)
MCHC RBC-ENTMCNC: 28.8 PG — SIGNIFICANT CHANGE UP (ref 27–34)
MCHC RBC-ENTMCNC: 32.8 G/DL — SIGNIFICANT CHANGE UP (ref 32–36)
MCV RBC AUTO: 87.7 FL — SIGNIFICANT CHANGE UP (ref 80–100)
NRBC BLD AUTO-RTO: 0 /100 WBCS — SIGNIFICANT CHANGE UP (ref 0–0)
PLATELET # BLD AUTO: 224 K/UL — SIGNIFICANT CHANGE UP (ref 150–400)
POTASSIUM SERPL-MCNC: 4.6 MMOL/L — SIGNIFICANT CHANGE UP (ref 3.5–5.3)
POTASSIUM SERPL-SCNC: 4.6 MMOL/L — SIGNIFICANT CHANGE UP (ref 3.5–5.3)
RBC # BLD: 4.97 M/UL — SIGNIFICANT CHANGE UP (ref 4.2–5.8)
RBC # FLD: 13.3 % — SIGNIFICANT CHANGE UP (ref 10.3–14.5)
SODIUM SERPL-SCNC: 135 MMOL/L — SIGNIFICANT CHANGE UP (ref 135–145)
WBC # BLD: 7.72 K/UL — SIGNIFICANT CHANGE UP (ref 3.8–10.5)
WBC # FLD AUTO: 7.72 K/UL — SIGNIFICANT CHANGE UP (ref 3.8–10.5)

## 2025-04-15 PROCEDURE — C1894: CPT

## 2025-04-15 PROCEDURE — C1725: CPT

## 2025-04-15 PROCEDURE — 93010 ELECTROCARDIOGRAM REPORT: CPT

## 2025-04-15 PROCEDURE — 93455 CORONARY ART/GRFT ANGIO S&I: CPT | Mod: 59

## 2025-04-15 PROCEDURE — 80048 BASIC METABOLIC PNL TOTAL CA: CPT

## 2025-04-15 PROCEDURE — 93005 ELECTROCARDIOGRAM TRACING: CPT

## 2025-04-15 PROCEDURE — 93455 CORONARY ART/GRFT ANGIO S&I: CPT | Mod: 26,59

## 2025-04-15 PROCEDURE — 92928 PRQ TCAT PLMT NTRAC ST 1 LES: CPT | Mod: RC

## 2025-04-15 PROCEDURE — 85027 COMPLETE CBC AUTOMATED: CPT

## 2025-04-15 PROCEDURE — C1874: CPT

## 2025-04-15 PROCEDURE — 99152 MOD SED SAME PHYS/QHP 5/>YRS: CPT

## 2025-04-15 PROCEDURE — C1769: CPT

## 2025-04-15 PROCEDURE — C1887: CPT

## 2025-04-15 PROCEDURE — 82962 GLUCOSE BLOOD TEST: CPT

## 2025-04-15 PROCEDURE — C9600: CPT | Mod: RC

## 2025-04-15 RX ORDER — DEXTROSE 50 % IN WATER 50 %
25 SYRINGE (ML) INTRAVENOUS ONCE
Refills: 0 | Status: DISCONTINUED | OUTPATIENT
Start: 2025-04-15 | End: 2025-04-29

## 2025-04-15 RX ORDER — ENALAPRIL MALEATE 20 MG
1 TABLET ORAL
Refills: 0 | DISCHARGE

## 2025-04-15 RX ORDER — SODIUM CHLORIDE 9 G/1000ML
1000 INJECTION, SOLUTION INTRAVENOUS
Refills: 0 | Status: DISCONTINUED | OUTPATIENT
Start: 2025-04-15 | End: 2025-04-29

## 2025-04-15 RX ORDER — ROSUVASTATIN CALCIUM 5 MG/1
1 TABLET, FILM COATED ORAL
Refills: 0 | DISCHARGE

## 2025-04-15 RX ORDER — ASPIRIN 325 MG
81 TABLET ORAL DAILY
Refills: 0 | Status: DISCONTINUED | OUTPATIENT
Start: 2025-04-15 | End: 2025-04-29

## 2025-04-15 RX ORDER — INSULIN LISPRO 100 U/ML
INJECTION, SOLUTION INTRAVENOUS; SUBCUTANEOUS
Refills: 0 | Status: DISCONTINUED | OUTPATIENT
Start: 2025-04-15 | End: 2025-04-29

## 2025-04-15 RX ORDER — ASPIRIN 325 MG
1 TABLET ORAL
Qty: 90 | Refills: 3
Start: 2025-04-15 | End: 2026-04-09

## 2025-04-15 RX ORDER — INSULIN LISPRO 100 U/ML
INJECTION, SOLUTION INTRAVENOUS; SUBCUTANEOUS AT BEDTIME
Refills: 0 | Status: DISCONTINUED | OUTPATIENT
Start: 2025-04-15 | End: 2025-04-29

## 2025-04-15 RX ORDER — DEXTROSE 50 % IN WATER 50 %
15 SYRINGE (ML) INTRAVENOUS ONCE
Refills: 0 | Status: DISCONTINUED | OUTPATIENT
Start: 2025-04-15 | End: 2025-04-29

## 2025-04-15 RX ORDER — ENALAPRIL MALEATE 20 MG
5 TABLET ORAL DAILY
Refills: 0 | Status: DISCONTINUED | OUTPATIENT
Start: 2025-04-15 | End: 2025-04-29

## 2025-04-15 RX ORDER — GLUCAGON 3 MG/1
1 POWDER NASAL ONCE
Refills: 0 | Status: DISCONTINUED | OUTPATIENT
Start: 2025-04-15 | End: 2025-04-29

## 2025-04-15 RX ORDER — ROSUVASTATIN CALCIUM 5 MG/1
10 TABLET, FILM COATED ORAL AT BEDTIME
Refills: 0 | Status: DISCONTINUED | OUTPATIENT
Start: 2025-04-15 | End: 2025-04-29

## 2025-04-15 RX ORDER — CLOPIDOGREL BISULFATE 75 MG/1
75 TABLET, FILM COATED ORAL DAILY
Refills: 0 | Status: DISCONTINUED | OUTPATIENT
Start: 2025-04-15 | End: 2025-04-29

## 2025-04-15 RX ORDER — DEXTROSE 50 % IN WATER 50 %
12.5 SYRINGE (ML) INTRAVENOUS ONCE
Refills: 0 | Status: DISCONTINUED | OUTPATIENT
Start: 2025-04-15 | End: 2025-04-29

## 2025-04-15 RX ORDER — CLOPIDOGREL BISULFATE 75 MG/1
1 TABLET, FILM COATED ORAL
Qty: 90 | Refills: 3
Start: 2025-04-15 | End: 2026-04-09

## 2025-04-15 RX ADMIN — Medication 100 MILLILITER(S): at 13:40

## 2025-04-15 RX ADMIN — Medication 75 MILLILITER(S): at 10:34

## 2025-04-15 RX ADMIN — Medication 500 MILLILITER(S): at 10:34

## 2025-04-15 NOTE — CHART NOTE - NSCHARTNOTEFT_GEN_A_CORE
Removal of Femoral Sheath by Carla Pack NP    Pulses in the R lower extremity are palpable. The patient was placed in the supine position. The insertion site was identified and the sutures were removed per protocol.  The 6 Ivorian femoral sheath was then removed. Direct pressure was applied for  20 minutes.     Monitoring of the R groin and both lower extremities including neuro-vascular checks and vital signs every 15 minutes x 4, then every 30 minutes x 2, then every 1 hour was ordered.    Complications: None

## 2025-04-15 NOTE — ASU DISCHARGE PLAN (ADULT/PEDIATRIC) - FINANCIAL ASSISTANCE
[facility] provides services at a reduced cost to those who are determined to be eligible through [facility]’s financial assistance program. Information regarding [facility]’s financial assistance program can be found by going to [email or web address] or by calling [phone].

## 2025-04-15 NOTE — ASU DISCHARGE PLAN (ADULT/PEDIATRIC) - NS MD DC FALL RISK RISK
For information on Fall & Injury Prevention, visit: [email or web address] OR  [email or web address] OR  https://www.cdc.gov/steadi/patient.html

## 2025-04-15 NOTE — H&P CARDIOLOGY - HISTORY OF PRESENT ILLNESS
Cardiologist: Dr. Patrica Daly  Pharmacy: [facility] ([address] [place] [address], [place])    75 y/o M w/ PMHx of HTN, HLD, Type 2 Diabetes, CAD s/p CABG x2 17 (LIMA-LAD, SVG-OM) initially presented to outpatient cardiologist for routine evaluation. Underwent nuclear stress test revealing mid anterior defect. In light of pt's risk factors, abnormal stress test and known hx of CAD; pt referred to [facility] for recommended cardiac catheterization w/ possible intervention if clinically indicated. Endorses good compliance w/ ASA, last dose 4/15AM. Denies headaches, changes in vision, dizziness, chest pain, palpitations, SOB/, abdominal pain, N/V/D, leg pain/edema, hematuria, BRBPR, melena or any other complaints.

## 2025-04-15 NOTE — ASU DISCHARGE PLAN (ADULT/PEDIATRIC) - CARE PROVIDER_API CALL
Patrica Daly  Interventional Cardiology  [address] [place] [address]  Phone: [phone]  Fax: [phone]  Follow Up Time: 2 weeks

## 2025-04-15 NOTE — H&P CARDIOLOGY - NSICDXPASTMEDICALHX_GEN_ALL_CORE_FT
PAST MEDICAL HISTORY:  Coronary artery disease due to lipid rich plaque     HLD (hyperlipidemia)     HTN (hypertension)     Type 2 diabetes mellitus with complication, unspecified whether long term insulin use

## 2025-04-29 ENCOUNTER — APPOINTMENT (OUTPATIENT)
Dept: CARDIOLOGY | Facility: CLINIC | Age: 75
End: 2025-04-29
Payer: COMMERCIAL

## 2025-04-29 ENCOUNTER — NON-APPOINTMENT (OUTPATIENT)
Age: 75
End: 2025-04-29

## 2025-04-29 VITALS
WEIGHT: 203 LBS | DIASTOLIC BLOOD PRESSURE: 70 MMHG | SYSTOLIC BLOOD PRESSURE: 132 MMHG | BODY MASS INDEX: 32.62 KG/M2 | OXYGEN SATURATION: 97 % | RESPIRATION RATE: 16 BRPM | HEART RATE: 76 BPM | HEIGHT: 66 IN

## 2025-04-29 DIAGNOSIS — I10 ESSENTIAL (PRIMARY) HYPERTENSION: ICD-10-CM

## 2025-04-29 DIAGNOSIS — E78.5 HYPERLIPIDEMIA, UNSPECIFIED: ICD-10-CM

## 2025-04-29 DIAGNOSIS — I25.10 ATHEROSCLEROTIC HEART DISEASE OF NATIVE CORONARY ARTERY W/OUT ANGINA PECTORIS: ICD-10-CM

## 2025-04-29 PROCEDURE — G2211 COMPLEX E/M VISIT ADD ON: CPT | Mod: NC

## 2025-04-29 PROCEDURE — 93000 ELECTROCARDIOGRAM COMPLETE: CPT

## 2025-04-29 PROCEDURE — 99214 OFFICE O/P EST MOD 30 MIN: CPT

## 2025-04-29 RX ORDER — CLOPIDOGREL BISULFATE 75 MG/1
75 TABLET, FILM COATED ORAL
Refills: 0 | Status: ACTIVE | COMMUNITY

## 2025-04-29 RX ORDER — ROSUVASTATIN CALCIUM 10 MG/1
10 TABLET, FILM COATED ORAL
Refills: 0 | Status: ACTIVE | COMMUNITY

## 2025-04-29 RX ORDER — ASPIRIN 81 MG/1
81 TABLET, DELAYED RELEASE ORAL
Refills: 0 | Status: ACTIVE | COMMUNITY

## 2025-04-29 RX ORDER — METFORMIN HYDROCHLORIDE 500 MG/1
500 TABLET, COATED ORAL
Refills: 0 | Status: ACTIVE | COMMUNITY

## 2025-04-29 RX ORDER — SEMAGLUTIDE 0.68 MG/ML
INJECTION, SOLUTION SUBCUTANEOUS
Refills: 0 | Status: ACTIVE | COMMUNITY

## 2025-08-01 ENCOUNTER — NON-APPOINTMENT (OUTPATIENT)
Age: 75
End: 2025-08-01

## 2025-08-01 ENCOUNTER — APPOINTMENT (OUTPATIENT)
Dept: CARDIOLOGY | Facility: CLINIC | Age: 75
End: 2025-08-01
Payer: COMMERCIAL

## 2025-08-01 VITALS
HEIGHT: 66 IN | SYSTOLIC BLOOD PRESSURE: 126 MMHG | OXYGEN SATURATION: 96 % | HEART RATE: 69 BPM | DIASTOLIC BLOOD PRESSURE: 60 MMHG | BODY MASS INDEX: 32.14 KG/M2 | WEIGHT: 200 LBS

## 2025-08-01 DIAGNOSIS — Z95.1 PRESENCE OF AORTOCORONARY BYPASS GRAFT: ICD-10-CM

## 2025-08-01 DIAGNOSIS — E78.5 HYPERLIPIDEMIA, UNSPECIFIED: ICD-10-CM

## 2025-08-01 DIAGNOSIS — I25.10 ATHEROSCLEROTIC HEART DISEASE OF NATIVE CORONARY ARTERY W/OUT ANGINA PECTORIS: ICD-10-CM

## 2025-08-01 DIAGNOSIS — I10 ESSENTIAL (PRIMARY) HYPERTENSION: ICD-10-CM

## 2025-08-01 DIAGNOSIS — E11.9 TYPE 2 DIABETES MELLITUS W/OUT COMPLICATIONS: ICD-10-CM

## 2025-08-01 PROCEDURE — 93000 ELECTROCARDIOGRAM COMPLETE: CPT

## 2025-08-01 PROCEDURE — G2211 COMPLEX E/M VISIT ADD ON: CPT | Mod: NC

## 2025-08-01 PROCEDURE — 99214 OFFICE O/P EST MOD 30 MIN: CPT

## 2025-09-15 ENCOUNTER — RESULT REVIEW (OUTPATIENT)
Age: 75
End: 2025-09-15

## 2025-09-16 ENCOUNTER — TRANSCRIPTION ENCOUNTER (OUTPATIENT)
Age: 75
End: 2025-09-16